# Patient Record
Sex: FEMALE | Race: WHITE | Employment: STUDENT | URBAN - METROPOLITAN AREA
[De-identification: names, ages, dates, MRNs, and addresses within clinical notes are randomized per-mention and may not be internally consistent; named-entity substitution may affect disease eponyms.]

---
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
## 2021-01-28 DIAGNOSIS — F41.9 ANXIETY: ICD-10-CM

## 2021-01-28 DIAGNOSIS — R63.5 WEIGHT GAIN: ICD-10-CM

## 2021-01-28 LAB
ALBUMIN SERPL-MCNC: 4.3 G/DL (ref 3.5–5.2)
ALP BLD-CCNC: 51 U/L (ref 35–104)
ALT SERPL-CCNC: 15 U/L (ref 0–32)
ANION GAP SERPL CALCULATED.3IONS-SCNC: 11 MMOL/L (ref 7–16)
AST SERPL-CCNC: 22 U/L (ref 0–31)
BASOPHILS ABSOLUTE: 0.03 E9/L (ref 0–0.2)
BASOPHILS RELATIVE PERCENT: 0.5 % (ref 0–2)
BILIRUB SERPL-MCNC: 0.6 MG/DL (ref 0–1.2)
BUN BLDV-MCNC: 16 MG/DL (ref 6–20)
CALCIUM SERPL-MCNC: 10.1 MG/DL (ref 8.6–10.2)
CHLORIDE BLD-SCNC: 101 MMOL/L (ref 98–107)
CHOLESTEROL, TOTAL: 157 MG/DL (ref 0–199)
CO2: 27 MMOL/L (ref 22–29)
CREAT SERPL-MCNC: 0.9 MG/DL (ref 0.5–1)
EOSINOPHILS ABSOLUTE: 0.07 E9/L (ref 0.05–0.5)
EOSINOPHILS RELATIVE PERCENT: 1.1 % (ref 0–6)
GFR AFRICAN AMERICAN: >60
GFR NON-AFRICAN AMERICAN: >60 ML/MIN/1.73
GLUCOSE BLD-MCNC: 88 MG/DL (ref 74–99)
HCT VFR BLD CALC: 42.6 % (ref 34–48)
HDLC SERPL-MCNC: 55 MG/DL
HEMOGLOBIN: 13.8 G/DL (ref 11.5–15.5)
IMMATURE GRANULOCYTES #: 0.01 E9/L
IMMATURE GRANULOCYTES %: 0.2 % (ref 0–5)
LDL CHOLESTEROL CALCULATED: 83 MG/DL (ref 0–99)
LYMPHOCYTES ABSOLUTE: 2.75 E9/L (ref 1.5–4)
LYMPHOCYTES RELATIVE PERCENT: 43.4 % (ref 20–42)
MCH RBC QN AUTO: 28.4 PG (ref 26–35)
MCHC RBC AUTO-ENTMCNC: 32.4 % (ref 32–34.5)
MCV RBC AUTO: 87.7 FL (ref 80–99.9)
MONOCYTES ABSOLUTE: 0.55 E9/L (ref 0.1–0.95)
MONOCYTES RELATIVE PERCENT: 8.7 % (ref 2–12)
NEUTROPHILS ABSOLUTE: 2.92 E9/L (ref 1.8–7.3)
NEUTROPHILS RELATIVE PERCENT: 46.1 % (ref 43–80)
PDW BLD-RTO: 11.7 FL (ref 11.5–15)
PLATELET # BLD: 311 E9/L (ref 130–450)
PMV BLD AUTO: 11.1 FL (ref 7–12)
POTASSIUM SERPL-SCNC: 4.2 MMOL/L (ref 3.5–5)
RBC # BLD: 4.86 E12/L (ref 3.5–5.5)
SODIUM BLD-SCNC: 139 MMOL/L (ref 132–146)
T4 TOTAL: 7.3 MCG/DL (ref 4.5–11.7)
TOTAL PROTEIN: 7.4 G/DL (ref 6.4–8.3)
TRIGL SERPL-MCNC: 95 MG/DL (ref 0–149)
TSH SERPL DL<=0.05 MIU/L-ACNC: 1.09 UIU/ML (ref 0.27–4.2)
VLDLC SERPL CALC-MCNC: 19 MG/DL
WBC # BLD: 6.3 E9/L (ref 4.5–11.5)

## 2021-02-01 ENCOUNTER — OFFICE VISIT (OUTPATIENT)
Dept: PRIMARY CARE CLINIC | Age: 21
End: 2021-02-01
Payer: COMMERCIAL

## 2021-02-01 VITALS
TEMPERATURE: 98.2 F | WEIGHT: 181 LBS | OXYGEN SATURATION: 97 % | DIASTOLIC BLOOD PRESSURE: 82 MMHG | SYSTOLIC BLOOD PRESSURE: 120 MMHG | HEART RATE: 104 BPM | BODY MASS INDEX: 25.24 KG/M2

## 2021-02-01 DIAGNOSIS — L30.9 DERMATITIS: ICD-10-CM

## 2021-02-01 DIAGNOSIS — R63.5 WEIGHT GAIN: ICD-10-CM

## 2021-02-01 DIAGNOSIS — F41.9 ANXIETY: Primary | ICD-10-CM

## 2021-02-01 PROCEDURE — 99214 OFFICE O/P EST MOD 30 MIN: CPT | Performed by: FAMILY MEDICINE

## 2021-02-01 RX ORDER — PREDNISONE 10 MG/1
10 TABLET ORAL 2 TIMES DAILY
Qty: 28 TABLET | Refills: 0 | Status: SHIPPED
Start: 2021-02-01 | End: 2021-02-15 | Stop reason: CLARIF

## 2021-02-01 RX ORDER — CLINDAMYCIN PHOSPHATE 10 UG/ML
LOTION TOPICAL PRN
COMMUNITY
Start: 2020-11-23 | End: 2022-03-23

## 2021-02-01 RX ORDER — KETOCONAZOLE 20 MG/G
CREAM TOPICAL
COMMUNITY
Start: 2021-01-06 | End: 2021-02-15

## 2021-02-01 ASSESSMENT — ENCOUNTER SYMPTOMS
GASTROINTESTINAL NEGATIVE: 1
EYES NEGATIVE: 1
ALLERGIC/IMMUNOLOGIC NEGATIVE: 1
RESPIRATORY NEGATIVE: 1

## 2021-02-01 NOTE — PROGRESS NOTES
21     Fredy Davies    : 2000 Sex: female   Age: 24 y.o. Chief Complaint   Patient presents with    Discuss Labs    Anxiety       Prior to Admission medications    Medication Sig Start Date End Date Taking? Authorizing Provider   clindamycin (CLEOCIN T) 1 % lotion as needed  20  Yes Historical Provider, MD   ketoconazole (NIZORAL) 2 % cream  21  Yes Historical Provider, MD   predniSONE (DELTASONE) 10 MG tablet Take 1 tablet by mouth 2 times daily for 14 days 2/1/21 2/15/21 Yes Narciso Caceres DO   MIBELAS 24 FE 1-20 MG-MCG(24) CHEW chew and swallow 1 tablet by mouth once daily 20  Yes Historical Provider, MD   EPINEPHrine (EPIPEN 2-ZAN) 0.3 MG/0.3ML SOAJ injection Inject 0.3 mLs into the muscle once for 1 dose Use as directed for allergic reaction 20 Yes Nadiya Isbell DO   FLUoxetine (PROZAC) 20 MG capsule Take 1 capsule by mouth daily 10/19/20  Yes Ulises Caceres DO   Adapalene 0.3 % GEL apply to affected area at bedtime 20  Yes Historical Provider, MD          HPI: Patient evaluated today with anxiety skin dermatitis weight gain. Medications reviewed well-tolerated continue as prescribed. Chest dermatitis appears to be more contact in nature rather than fungal.  Addition of some prednisone today and then encourage stopping the Nizoral cream temporarily and reassess in about 2 weeks. Encourage use of Dove soap for showering. Medically is otherwise stable weight remains stable continue encourage diet exercise. Anxiety stable on Prozac at 20 mg and maintain. Review of Systems   Constitutional: Negative. HENT: Negative. Eyes: Negative. Respiratory: Negative. Gastrointestinal: Negative. Endocrine: Negative. Genitourinary: Negative. Musculoskeletal: Negative. Skin: Positive for rash. Allergic/Immunologic: Negative. Neurological: Negative. Hematological: Negative. Psychiatric/Behavioral: Negative. Systems review stable aside from the rash as noted. Anxiety well controlled. Current Outpatient Medications:     clindamycin (CLEOCIN T) 1 % lotion, as needed , Disp: , Rfl:     ketoconazole (NIZORAL) 2 % cream, , Disp: , Rfl:     predniSONE (DELTASONE) 10 MG tablet, Take 1 tablet by mouth 2 times daily for 14 days, Disp: 28 tablet, Rfl: 0    MIBELAS 24 FE 1-20 MG-MCG(24) CHEW, chew and swallow 1 tablet by mouth once daily, Disp: , Rfl:     EPINEPHrine (EPIPEN 2-ZAN) 0.3 MG/0.3ML SOAJ injection, Inject 0.3 mLs into the muscle once for 1 dose Use as directed for allergic reaction, Disp: 0.3 mL, Rfl: 3    FLUoxetine (PROZAC) 20 MG capsule, Take 1 capsule by mouth daily, Disp: 30 capsule, Rfl: 5    Adapalene 0.3 % GEL, apply to affected area at bedtime, Disp: , Rfl:     Allergies   Allergen Reactions    Peanut-Containing Drug Products        Social History     Tobacco Use    Smoking status: Never Smoker    Smokeless tobacco: Never Used   Substance Use Topics    Alcohol use: Never     Frequency: Never    Drug use: Never      No past surgical history on file. No family history on file. No past medical history on file. Vitals:    02/01/21 1519   BP: 120/82   Pulse: (!) 104   Temp: 98.2 °F (36.8 °C)   SpO2: 97%   Weight: 181 lb (82.1 kg)     BP Readings from Last 3 Encounters:   02/01/21 120/82   12/21/20 112/60   11/23/20 120/78    Body mass index is 25.24 kg/m². Physical Exam  Vitals signs and nursing note reviewed. Constitutional:       Appearance: She is well-developed. HENT:      Head: Normocephalic. Right Ear: External ear normal.      Left Ear: External ear normal.      Nose: Nose normal.   Eyes:      Conjunctiva/sclera: Conjunctivae normal.      Pupils: Pupils are equal, round, and reactive to light. Neck:      Musculoskeletal: Normal range of motion and neck supple. Cardiovascular:      Rate and Rhythm: Normal rate. Pulmonary:      Breath sounds: Normal breath sounds.

## 2021-02-15 ENCOUNTER — OFFICE VISIT (OUTPATIENT)
Dept: PRIMARY CARE CLINIC | Age: 21
End: 2021-02-15
Payer: COMMERCIAL

## 2021-02-15 VITALS
HEART RATE: 112 BPM | BODY MASS INDEX: 24.97 KG/M2 | DIASTOLIC BLOOD PRESSURE: 82 MMHG | TEMPERATURE: 98.7 F | WEIGHT: 179 LBS | OXYGEN SATURATION: 97 % | SYSTOLIC BLOOD PRESSURE: 120 MMHG

## 2021-02-15 DIAGNOSIS — L70.0 ACNE VULGARIS: ICD-10-CM

## 2021-02-15 DIAGNOSIS — L30.9 DERMATITIS: Primary | ICD-10-CM

## 2021-02-15 DIAGNOSIS — J30.2 SEASONAL ALLERGIES: ICD-10-CM

## 2021-02-15 DIAGNOSIS — M54.50 MIDLINE LOW BACK PAIN WITHOUT SCIATICA, UNSPECIFIED CHRONICITY: ICD-10-CM

## 2021-02-15 DIAGNOSIS — F41.9 ANXIETY: ICD-10-CM

## 2021-02-15 PROCEDURE — 99214 OFFICE O/P EST MOD 30 MIN: CPT | Performed by: FAMILY MEDICINE

## 2021-02-15 RX ORDER — FLUOXETINE HYDROCHLORIDE 20 MG/1
20 CAPSULE ORAL DAILY
Qty: 30 CAPSULE | Refills: 5 | Status: SHIPPED
Start: 2021-02-15 | End: 2021-10-07

## 2021-02-15 ASSESSMENT — ENCOUNTER SYMPTOMS
ALLERGIC/IMMUNOLOGIC NEGATIVE: 1
GASTROINTESTINAL NEGATIVE: 1
RESPIRATORY NEGATIVE: 1
BACK PAIN: 1
EYES NEGATIVE: 1

## 2021-02-15 NOTE — PROGRESS NOTES
2/15/21     Yudith Velez    : 2000 Sex: female   Age: 24 y.o. Chief Complaint   Patient presents with    Rash     chest rash improved    Anxiety       Prior to Admission medications    Medication Sig Start Date End Date Taking? Authorizing Provider   FLUoxetine (PROZAC) 20 MG capsule Take 1 capsule by mouth daily 2/15/21  Yes Tian Caceres DO   clindamycin (CLEOCIN T) 1 % lotion as needed  20  Yes Historical Provider, MD   MIBELAS 24 FE 1-20 MG-MCG(24) CHEW chew and swallow 1 tablet by mouth once daily 20  Yes Historical Provider, MD   EPINEPHrine (EPIPEN 2-ZAN) 0.3 MG/0.3ML SOAJ injection Inject 0.3 mLs into the muscle once for 1 dose Use as directed for allergic reaction 11/23/20 2/15/21 Yes Nadiya Isbell,    Adapalene 0.3 % GEL apply to affected area at bedtime 20  Yes Historical Provider, MD          HPI: Patient evaluated today dermatitis anxiety acne vulgaris seasonal allergies low back discomfort. Medically she is doing much better. Skin rash on the chest is completely resolved. Some mild complaints of low back pain recommended some home stretches and then if persistent would follow-up. Anxiety much better control on the Prozac and continue as prescribed. Review of Systems   Constitutional: Negative. HENT: Negative. Eyes: Negative. Respiratory: Negative. Gastrointestinal: Negative. Endocrine: Negative. Genitourinary: Negative. Musculoskeletal: Positive for arthralgias, back pain and myalgias. Skin: Negative. Allergic/Immunologic: Negative. Neurological: Negative. Hematological: Negative. Psychiatric/Behavioral: Negative.                Current Outpatient Medications:     FLUoxetine (PROZAC) 20 MG capsule, Take 1 capsule by mouth daily, Disp: 30 capsule, Rfl: 5    clindamycin (CLEOCIN T) 1 % lotion, as needed , Disp: , Rfl:     MIBELAS 24 FE 1-20 MG-MCG(24) CHEW, chew and swallow 1 tablet by mouth once daily, Disp: , Rfl:     EPINEPHrine (EPIPEN 2-ZAN) 0.3 MG/0.3ML SOAJ injection, Inject 0.3 mLs into the muscle once for 1 dose Use as directed for allergic reaction, Disp: 0.3 mL, Rfl: 3    Adapalene 0.3 % GEL, apply to affected area at bedtime, Disp: , Rfl:     Allergies   Allergen Reactions    Peanut-Containing Drug Products        Social History     Tobacco Use    Smoking status: Never Smoker    Smokeless tobacco: Never Used   Substance Use Topics    Alcohol use: Never     Frequency: Never    Drug use: Never      No past surgical history on file. No family history on file. No past medical history on file. Vitals:    02/15/21 1519   BP: 120/82   Pulse: 112   Temp: 98.7 °F (37.1 °C)   SpO2: 97%   Weight: 179 lb (81.2 kg)     BP Readings from Last 3 Encounters:   02/15/21 120/82   02/01/21 120/82   12/21/20 112/60        Physical Exam  Vitals signs and nursing note reviewed. Constitutional:       Appearance: She is well-developed. HENT:      Head: Normocephalic. Right Ear: External ear normal.      Left Ear: External ear normal.      Nose: Nose normal.   Eyes:      Conjunctiva/sclera: Conjunctivae normal.      Pupils: Pupils are equal, round, and reactive to light. Neck:      Musculoskeletal: Normal range of motion and neck supple. Cardiovascular:      Rate and Rhythm: Normal rate. Pulmonary:      Breath sounds: Normal breath sounds. Abdominal:      General: Bowel sounds are normal.      Palpations: Abdomen is soft. Musculoskeletal: Normal range of motion. Skin:     General: Skin is warm and dry. Neurological:      Mental Status: She is alert and oriented to person, place, and time. Psychiatric:         Behavior: Behavior normal.        Today's vitals physical examination stable. We will maintain present meds and care. Reassessment with me now in 3 months and sooner if problems. Instructed in some home exercise and if persistent worsening problems recommended follow-up.         Plan

## 2021-05-13 ENCOUNTER — OFFICE VISIT (OUTPATIENT)
Dept: PRIMARY CARE CLINIC | Age: 21
End: 2021-05-13
Payer: COMMERCIAL

## 2021-05-13 VITALS
DIASTOLIC BLOOD PRESSURE: 70 MMHG | BODY MASS INDEX: 24.5 KG/M2 | OXYGEN SATURATION: 98 % | TEMPERATURE: 97.7 F | HEART RATE: 90 BPM | HEIGHT: 71 IN | SYSTOLIC BLOOD PRESSURE: 100 MMHG | WEIGHT: 175 LBS

## 2021-05-13 DIAGNOSIS — R63.5 WEIGHT GAIN: ICD-10-CM

## 2021-05-13 DIAGNOSIS — J30.1 SEASONAL ALLERGIC RHINITIS DUE TO POLLEN: ICD-10-CM

## 2021-05-13 DIAGNOSIS — M54.50 CHRONIC MIDLINE LOW BACK PAIN WITHOUT SCIATICA: ICD-10-CM

## 2021-05-13 DIAGNOSIS — M54.2 CERVICALGIA: ICD-10-CM

## 2021-05-13 DIAGNOSIS — F41.9 ANXIETY: Primary | ICD-10-CM

## 2021-05-13 DIAGNOSIS — G89.29 CHRONIC MIDLINE LOW BACK PAIN WITHOUT SCIATICA: ICD-10-CM

## 2021-05-13 PROCEDURE — 99214 OFFICE O/P EST MOD 30 MIN: CPT | Performed by: FAMILY MEDICINE

## 2021-05-13 RX ORDER — NORETHINDRONE AND ETHINYL ESTRADIOL TABLETS 0.4-0.035
KIT ORAL
COMMUNITY
Start: 2021-03-14 | End: 2021-11-24

## 2021-05-13 ASSESSMENT — PATIENT HEALTH QUESTIONNAIRE - PHQ9
1. LITTLE INTEREST OR PLEASURE IN DOING THINGS: 0
2. FEELING DOWN, DEPRESSED OR HOPELESS: 0
SUM OF ALL RESPONSES TO PHQ QUESTIONS 1-9: 0

## 2021-05-13 ASSESSMENT — ENCOUNTER SYMPTOMS
ALLERGIC/IMMUNOLOGIC NEGATIVE: 1
EYES NEGATIVE: 1
GASTROINTESTINAL NEGATIVE: 1
RESPIRATORY NEGATIVE: 1

## 2021-05-13 NOTE — PROGRESS NOTES
21     Erica Sotomayor    : 2000 Sex: female   Age: 22 y.o. Chief Complaint   Patient presents with    Anxiety       Prior to Admission medications    Medication Sig Start Date End Date Taking? Authorizing Provider   Jun Degroot 0.4-35 MG-MCG per tablet take 1 tablet by mouth once daily 3/14/21  Yes Historical Provider, MD   FLUoxetine (PROZAC) 20 MG capsule Take 1 capsule by mouth daily 2/15/21  Yes Joshua Caceres DO   clindamycin (CLEOCIN T) 1 % lotion as needed  20  Yes Historical Provider, MD   Adapalene 0.3 % GEL apply to affected area at bedtime 20  Yes Historical Provider, MD   EPINEPHrine (EPIPEN 2-ZAN) 0.3 MG/0.3ML SOAJ injection Inject 0.3 mLs into the muscle once for 1 dose Use as directed for allergic reaction 11/23/20 2/15/21  Nadiya Isbell DO          HPI: Patient evaluated today with anxiety weight gain seasonal allergies chronic back pain and neck discomfort. Neck and back primarily muscular in origin. Chiropractics discussed and she is in agreement. Should benefit. Anxiety significantly improved on fluoxetine and doing well. Maintain med at current dose. Weight is down to 175 pounds and has responded very nicely to the change in medicine to Prozac. Review of Systems   Constitutional: Negative. HENT: Negative. Eyes: Negative. Respiratory: Negative. Gastrointestinal: Negative. Endocrine: Negative. Genitourinary: Negative. Musculoskeletal: Negative. Skin: Negative. Allergic/Immunologic: Negative. Neurological: Negative. Hematological: Negative. Psychiatric/Behavioral: Negative.                Current Outpatient Medications:     PHILITH 0.4-35 MG-MCG per tablet, take 1 tablet by mouth once daily, Disp: , Rfl:     FLUoxetine (PROZAC) 20 MG capsule, Take 1 capsule by mouth daily, Disp: 30 capsule, Rfl: 5    clindamycin (CLEOCIN T) 1 % lotion, as needed , Disp: , Rfl:     Adapalene 0.3 % GEL, apply to affected area

## 2021-05-17 ENCOUNTER — OFFICE VISIT (OUTPATIENT)
Dept: PRIMARY CARE CLINIC | Age: 21
End: 2021-05-17
Payer: COMMERCIAL

## 2021-05-17 ENCOUNTER — TELEPHONE (OUTPATIENT)
Dept: PRIMARY CARE CLINIC | Age: 21
End: 2021-05-17

## 2021-05-17 VITALS
OXYGEN SATURATION: 98 % | TEMPERATURE: 97.9 F | HEART RATE: 76 BPM | SYSTOLIC BLOOD PRESSURE: 120 MMHG | DIASTOLIC BLOOD PRESSURE: 82 MMHG

## 2021-05-17 DIAGNOSIS — J30.1 SEASONAL ALLERGIC RHINITIS DUE TO POLLEN: Primary | ICD-10-CM

## 2021-05-17 DIAGNOSIS — F41.9 ANXIETY: ICD-10-CM

## 2021-05-17 PROCEDURE — 96372 THER/PROPH/DIAG INJ SC/IM: CPT | Performed by: FAMILY MEDICINE

## 2021-05-17 PROCEDURE — 99213 OFFICE O/P EST LOW 20 MIN: CPT | Performed by: FAMILY MEDICINE

## 2021-05-17 RX ORDER — METHYLPREDNISOLONE ACETATE 80 MG/ML
80 INJECTION, SUSPENSION INTRA-ARTICULAR; INTRALESIONAL; INTRAMUSCULAR; SOFT TISSUE ONCE
Status: COMPLETED | OUTPATIENT
Start: 2021-05-17 | End: 2021-05-17

## 2021-05-17 RX ADMIN — METHYLPREDNISOLONE ACETATE 80 MG: 80 INJECTION, SUSPENSION INTRA-ARTICULAR; INTRALESIONAL; INTRAMUSCULAR; SOFT TISSUE at 14:10

## 2021-05-17 ASSESSMENT — ENCOUNTER SYMPTOMS
ALLERGIC/IMMUNOLOGIC NEGATIVE: 1
GASTROINTESTINAL NEGATIVE: 1
EYES NEGATIVE: 1
RESPIRATORY NEGATIVE: 1

## 2021-05-17 NOTE — PROGRESS NOTES
21     Erica Sotomayor    : 2000 Sex: female   Age: 22 y.o. Chief Complaint   Patient presents with    Drainage     alot of sinus drainage       Prior to Admission medications    Medication Sig Start Date End Date Taking? Authorizing Provider   Jun Degroot 0.4-35 MG-MCG per tablet take 1 tablet by mouth once daily 3/14/21  Yes Historical Provider, MD   FLUoxetine (PROZAC) 20 MG capsule Take 1 capsule by mouth daily 2/15/21  Yes Parish Caceres, DO   clindamycin (CLEOCIN T) 1 % lotion as needed  20  Yes Historical Provider, MD   EPINEPHrine (EPIPEN 2-ZAN) 0.3 MG/0.3ML SOAJ injection Inject 0.3 mLs into the muscle once for 1 dose Use as directed for allergic reaction 20 Yes Nadiya Isbell, DO   Adapalene 0.3 % GEL apply to affected area at bedtime 20  Yes Historical Provider, MD          HPI: Patient seen today problems seasonal allergies anxiety. Recent evaluation on her anxiety depression she is doing very well. Today primary issues of seasonal allergies. We are are going to treat with cc of Depo-Medrol which she has done very well with in the past 80 mg. Review of Systems   Constitutional: Negative. HENT: Positive for congestion. Eyes: Negative. Respiratory: Negative. Gastrointestinal: Negative. Endocrine: Negative. Genitourinary: Negative. Musculoskeletal: Negative. Skin: Negative. Allergic/Immunologic: Negative. Neurological: Negative. Hematological: Negative. Psychiatric/Behavioral: Negative. Mild congestion itchy eyes nasal drainage all related to seasonal allergies.           Current Outpatient Medications:     PHILITH 0.4-35 MG-MCG per tablet, take 1 tablet by mouth once daily, Disp: , Rfl:     FLUoxetine (PROZAC) 20 MG capsule, Take 1 capsule by mouth daily, Disp: 30 capsule, Rfl: 5    clindamycin (CLEOCIN T) 1 % lotion, as needed , Disp: , Rfl:     EPINEPHrine (EPIPEN 2-ZAN) 0.3 MG/0.3ML SOAJ injection, Inject 0.3 mLs into the muscle once for 1 dose Use as directed for allergic reaction, Disp: 0.3 mL, Rfl: 3    Adapalene 0.3 % GEL, apply to affected area at bedtime, Disp: , Rfl:     Allergies   Allergen Reactions    Peanut-Containing Drug Products        Social History     Tobacco Use    Smoking status: Never Smoker    Smokeless tobacco: Never Used   Substance Use Topics    Alcohol use: Never    Drug use: Never      No past surgical history on file. No family history on file. No past medical history on file. Vitals:    05/17/21 1348   BP: 120/82   Pulse: 76   Temp: 97.9 °F (36.6 °C)   SpO2: 98%     BP Readings from Last 3 Encounters:   05/17/21 120/82   05/13/21 100/70   02/15/21 120/82        Physical Exam  Nursing note reviewed. Blood pressure controlled heart regular lungs clear. Clear drainage posterior pharynx mild conjunctival injection. Treatment with Depo-Medrol injection 80 mg Zyrtec as needed and follow-up if necessary. Plan Per Assessment:  Jyothi Reyes was seen today for drainage. Diagnoses and all orders for this visit:    Seasonal allergic rhinitis due to pollen    Anxiety            No follow-ups on file. Manjula Argentina, DO    Note was generated with the assistance of voice recognition software. Document was reviewed however may contain grammatical errors.

## 2021-05-17 NOTE — TELEPHONE ENCOUNTER
Spoke with pt to schedule Chiro appointment. She is a patient of Dr. Jeet Cain, she is wondering if she can get an allergy shot sometime today. She is going to be leaving to go out of town later this week. She can be reached at Ph. [phone].

## 2021-05-25 ENCOUNTER — OFFICE VISIT (OUTPATIENT)
Dept: CHIROPRACTIC MEDICINE | Age: 21
End: 2021-05-25
Payer: COMMERCIAL

## 2021-05-25 VITALS — HEART RATE: 78 BPM | RESPIRATION RATE: 16 BRPM | TEMPERATURE: 98 F | OXYGEN SATURATION: 98 %

## 2021-05-25 DIAGNOSIS — M54.50 CHRONIC MIDLINE LOW BACK PAIN WITHOUT SCIATICA: Primary | ICD-10-CM

## 2021-05-25 DIAGNOSIS — M62.830 MUSCLE SPASM OF BACK: ICD-10-CM

## 2021-05-25 DIAGNOSIS — G89.29 CHRONIC MIDLINE LOW BACK PAIN WITHOUT SCIATICA: Primary | ICD-10-CM

## 2021-05-25 DIAGNOSIS — M54.04 PANNICULITIS AFFECTING REGIONS OF NECK AND BACK, THORACIC REGION: ICD-10-CM

## 2021-05-25 DIAGNOSIS — M54.2 CERVICALGIA: ICD-10-CM

## 2021-05-25 PROCEDURE — 97014 ELECTRIC STIMULATION THERAPY: CPT | Performed by: CHIROPRACTOR

## 2021-05-25 PROCEDURE — 99203 OFFICE O/P NEW LOW 30 MIN: CPT | Performed by: CHIROPRACTOR

## 2021-05-25 PROCEDURE — 98941 CHIROPRACT MANJ 3-4 REGIONS: CPT | Performed by: CHIROPRACTOR

## 2021-05-25 ASSESSMENT — ENCOUNTER SYMPTOMS
SHORTNESS OF BREATH: 0
BACK PAIN: 1
COUGH: 0

## 2021-05-25 NOTE — PROGRESS NOTES
[facility]    21  Erica Sotomayor : 2000 Sex: female  Age: 22 y.o. Patient was referred by Nadira España DO    Chief Complaint   Patient presents with    Neck Pain    Lower Back Pain     Slid on ice in February and has had pain and tightness since. Tiana Gloss on butt in February, hit head. Since then she's been off  Feels like low back needs cracked, neck feels tight. She contineus to work out most days of the week -- cardio and weights  No UE/LE issues  Low back worse than upper/neck  Just back from vacation - 12 hour drive seemed to flare up. Shoulders feel uneven, R handed. Back Pain  This is a new problem. The current episode started more than 1 month ago. The problem occurs constantly. The problem has been waxing and waning since onset. The pain is present in the thoracic spine and lumbar spine (cervical). Quality: tight/ache. The pain does not radiate. The pain is at a severity of 5/10. Stiffness is present in the morning. Pertinent negatives include no fever, leg pain, numbness or weakness. She has tried home exercises, analgesics and NSAIDs for the symptoms. Red Flags:  none    Review of Systems   Constitutional: Negative for chills and fever. Respiratory: Negative for cough and shortness of breath. Musculoskeletal: Positive for back pain, myalgias and neck stiffness. Neurological: Negative for weakness and numbness.          Current Outpatient Medications:     PHILITH 0.4-35 MG-MCG per tablet, take 1 tablet by mouth once daily, Disp: , Rfl:     FLUoxetine (PROZAC) 20 MG capsule, Take 1 capsule by mouth daily, Disp: 30 capsule, Rfl: 5    clindamycin (CLEOCIN T) 1 % lotion, as needed , Disp: , Rfl:     EPINEPHrine (EPIPEN 2-ZAN) 0.3 MG/0.3ML SOAJ injection, Inject 0.3 mLs into the muscle once for 1 dose Use as directed for allergic reaction, Disp: 0.3 mL, Rfl: 3    Adapalene 0.3 % GEL, apply to affected area at bedtime, Disp: , Rfl: Allergies   Allergen Reactions    Peanut-Containing Drug Products        No past medical history on file. No family history on file. No past surgical history on file. Social History     Socioeconomic History    Marital status: Single     Spouse name: Not on file    Number of children: Not on file    Years of education: Not on file    Highest education level: Not on file   Occupational History    Not on file   Tobacco Use    Smoking status: Never Smoker    Smokeless tobacco: Never Used   Substance and Sexual Activity    Alcohol use: Never    Drug use: Never    Sexual activity: Not on file   Other Topics Concern    Not on file   Social History Narrative    Not on file     Social Determinants of Health     Financial Resource Strain:     Difficulty of Paying Living Expenses:    Food Insecurity:     Worried About Running Out of Food in the Last Year:     [address] in the Last Year:    Transportation Needs:     Lack of Transportation (Medical):  Lack of Transportation (Non-Medical):    Physical Activity:     Days of Exercise per Week:     Minutes of Exercise per Session:    Stress:     Feeling of Stress :    Social Connections:     Frequency of Communication with Friends and Family:     Frequency of Social Gatherings with Friends and Family:     Attends Buddhist Services:     Active Member of Clubs or Organizations:     Attends Club or Organization Meetings:     Marital Status:    Intimate Partner Violence:     Fear of Current or Ex-Partner:     Emotionally Abused:     Physically Abused:     Sexually Abused:        Vitals:    05/25/21 0941   Pulse: 78   Resp: 16   Temp: 98 °F (36.7 °C)   TempSrc: Temporal   SpO2: 98%       EXAM:  She stands unassisted. No antalgia or list.  There is some elevation of the left shoulder compared to right. Mild left scapular winging. In Magda Kartik position suggests possibility of underlying scoliotic deformity.     She displays near full, complete and active range of motion of the lumbar spine with the exception of extension which was 50% of normal and causing endrange pain  In the cervical spine motions were mildly limited throughout without pain. She is neurovascularly intact throughout the upper and lower extremities. Active trigger point left trapezius, left levator scapula. No midline pain throughout the cervical or thoracic regions. She did have some tenderness in the midline however over L5-S1. Hypertonic and tender fibers throughout the cervical, thoracic and lumbar paraspinal muscles. Joint fixation of motion screening C6-T1, T5-6, L5-S1    Cervical compression and distraction test were both negative. Foraminal compression testing and maximum cervical rotatory compression tests are negative bilateral.  Seated and supine SLRs are both negative. Braggart's testing is negative bilateral Valverde's test negative bilateral.  Prone lying reduces pain. Prone lying in extension increases pain to 5-6/10. Repeated extensions in lying-not performed    Erica was seen today for neck pain and lower back pain. Diagnoses and all orders for this visit:    Chronic midline low back pain without sciatica    Panniculitis affecting regions of neck and back, thoracic region    Cervicalgia    Muscle spasm of back        Treatment Plan:   I spoke with her regarding my exam findings and treatment options. I recommended that we start a trial of conservative care today consisting manipulation, EMS. I will plan on seeing her 1 times per week for 4 weeks, then reassess to determine response to care and future options. With consent, I did begin today. Consider updating some imaging should she fail to respond to care. Possibility getting a scoliosis study as well      Problem/Goals  Decrease pain and/or swelling    Today's Treatment  EMS with heat to the lumbar region for 15 minutes to address muscle spasm/hypertension and alleviate pain.   Diversified

## 2021-06-01 ENCOUNTER — OFFICE VISIT (OUTPATIENT)
Dept: CHIROPRACTIC MEDICINE | Age: 21
End: 2021-06-01
Payer: COMMERCIAL

## 2021-06-01 VITALS — RESPIRATION RATE: 14 BRPM | TEMPERATURE: 98 F | HEART RATE: 68 BPM | OXYGEN SATURATION: 97 %

## 2021-06-01 DIAGNOSIS — M54.50 CHRONIC MIDLINE LOW BACK PAIN WITHOUT SCIATICA: Primary | ICD-10-CM

## 2021-06-01 DIAGNOSIS — G89.29 CHRONIC MIDLINE LOW BACK PAIN WITHOUT SCIATICA: Primary | ICD-10-CM

## 2021-06-01 DIAGNOSIS — M54.2 CERVICALGIA: ICD-10-CM

## 2021-06-01 DIAGNOSIS — M62.830 MUSCLE SPASM OF BACK: ICD-10-CM

## 2021-06-01 DIAGNOSIS — M54.04 PANNICULITIS AFFECTING REGIONS OF NECK AND BACK, THORACIC REGION: ICD-10-CM

## 2021-06-01 PROCEDURE — 98941 CHIROPRACT MANJ 3-4 REGIONS: CPT | Performed by: CHIROPRACTOR

## 2021-06-01 PROCEDURE — 97014 ELECTRIC STIMULATION THERAPY: CPT | Performed by: CHIROPRACTOR

## 2021-06-01 NOTE — PROGRESS NOTES
21  Erica Sotomayor : 2000 Sex: female  Age: 22 y.o. Chief Complaint   Patient presents with    Lower Back Pain       HPI:   Neck and upper back pain is improving, but low back pain is essentially unchanged. She feels that some of her gym activities may be provoking the lower back. She is particularly noting some hip extension type exercises, core exercises as provocative. Also laying prone on the table today gave her some discomfort. Current Outpatient Medications:     PHILITH 0.4-35 MG-MCG per tablet, take 1 tablet by mouth once daily, Disp: , Rfl:     FLUoxetine (PROZAC) 20 MG capsule, Take 1 capsule by mouth daily, Disp: 30 capsule, Rfl: 5    clindamycin (CLEOCIN T) 1 % lotion, as needed , Disp: , Rfl:     EPINEPHrine (EPIPEN 2-ZAN) 0.3 MG/0.3ML SOAJ injection, Inject 0.3 mLs into the muscle once for 1 dose Use as directed for allergic reaction, Disp: 0.3 mL, Rfl: 3    Adapalene 0.3 % GEL, apply to affected area at bedtime, Disp: , Rfl:     Exam:   Vitals:    21 0908   Pulse: 68   Resp: 14   Temp: 98 °F (36.7 °C)   SpO2: 97%     Prone lying in extension increases symptoms. She has some tenderness over the left SI joint and left L5-S1 facet today. There are hypertonic and tender fibers noted today in the lumbar, thoracic and cervical paraspinal muscles. Joint fixation is noted with motion screening at C6-T1, T4-6, T12-L1, L5-S1. Erica was seen today for lower back pain. Diagnoses and all orders for this visit:    Chronic midline low back pain without sciatica    Panniculitis affecting regions of neck and back, thoracic region    Cervicalgia    Muscle spasm of back        Treatment Plan: She has extension intolerant low back pain, seemingly provoked by ADLs and positions. Going to try modify these today. We went through some exercise modifications for her to do at the gym.   I will also send her some core exercises focusing on antirotation and antiextension. Trujillo flexion distraction manipulation was performed today at L5, protocol 1. Diversified manipulation to listed cervical and thoracic, thoracolumbar segments. Tolerated well. EMS with heat to the low back for 15 minutes to address pain and muscle spasming. Tolerated well. I will see her back later this week for care. We did discuss getting some x-rays on her lower back.   We will see how she responds and proceed accordingly      Seen By:  Evelyn Hope, DC

## 2021-06-04 ENCOUNTER — OFFICE VISIT (OUTPATIENT)
Dept: CHIROPRACTIC MEDICINE | Age: 21
End: 2021-06-04
Payer: COMMERCIAL

## 2021-06-04 VITALS — RESPIRATION RATE: 14 BRPM | TEMPERATURE: 98 F | HEART RATE: 58 BPM | OXYGEN SATURATION: 98 %

## 2021-06-04 DIAGNOSIS — M62.830 MUSCLE SPASM OF BACK: ICD-10-CM

## 2021-06-04 DIAGNOSIS — M54.04 PANNICULITIS AFFECTING REGIONS OF NECK AND BACK, THORACIC REGION: ICD-10-CM

## 2021-06-04 DIAGNOSIS — M54.50 CHRONIC MIDLINE LOW BACK PAIN WITHOUT SCIATICA: Primary | ICD-10-CM

## 2021-06-04 DIAGNOSIS — G89.29 CHRONIC MIDLINE LOW BACK PAIN WITHOUT SCIATICA: Primary | ICD-10-CM

## 2021-06-04 PROCEDURE — 98940 CHIROPRACT MANJ 1-2 REGIONS: CPT | Performed by: CHIROPRACTOR

## 2021-06-04 PROCEDURE — 97014 ELECTRIC STIMULATION THERAPY: CPT | Performed by: CHIROPRACTOR

## 2021-06-07 ENCOUNTER — OFFICE VISIT (OUTPATIENT)
Dept: CHIROPRACTIC MEDICINE | Age: 21
End: 2021-06-07
Payer: COMMERCIAL

## 2021-06-07 VITALS — OXYGEN SATURATION: 98 % | HEART RATE: 80 BPM | TEMPERATURE: 98.8 F | RESPIRATION RATE: 14 BRPM

## 2021-06-07 DIAGNOSIS — M62.830 MUSCLE SPASM OF BACK: ICD-10-CM

## 2021-06-07 DIAGNOSIS — M54.2 CERVICALGIA: ICD-10-CM

## 2021-06-07 DIAGNOSIS — G89.29 CHRONIC MIDLINE LOW BACK PAIN WITHOUT SCIATICA: Primary | ICD-10-CM

## 2021-06-07 DIAGNOSIS — M54.50 CHRONIC MIDLINE LOW BACK PAIN WITHOUT SCIATICA: Primary | ICD-10-CM

## 2021-06-07 DIAGNOSIS — M54.04 PANNICULITIS AFFECTING REGIONS OF NECK AND BACK, THORACIC REGION: ICD-10-CM

## 2021-06-07 PROCEDURE — 98941 CHIROPRACT MANJ 3-4 REGIONS: CPT | Performed by: CHIROPRACTOR

## 2021-06-07 NOTE — PROGRESS NOTES
21  Erica Sotomayor : 2000 Sex: female  Age: 22 y.o. Chief Complaint   Patient presents with    Lower Back Pain    Results     xray results       HPI:   Canon dominguez returns today for care. She is accompanied by her mother. Over the past several days she is admittedly not done much. She has not gone to the gym, has not done her HEP. As a result, she is feeling pretty good today. Minimal pain if any. She would like to review her x-rays. She does have some continued tightness in soreness in the lower back, with knots and discomfort in the neck/upper back and interscapular region again today per her identification. Current Outpatient Medications:     PHILITH 0.4-35 MG-MCG per tablet, take 1 tablet by mouth once daily, Disp: , Rfl:     FLUoxetine (PROZAC) 20 MG capsule, Take 1 capsule by mouth daily, Disp: 30 capsule, Rfl: 5    clindamycin (CLEOCIN T) 1 % lotion, as needed , Disp: , Rfl:     EPINEPHrine (EPIPEN 2-ZAN) 0.3 MG/0.3ML SOAJ injection, Inject 0.3 mLs into the muscle once for 1 dose Use as directed for allergic reaction, Disp: 0.3 mL, Rfl: 3    Adapalene 0.3 % GEL, apply to affected area at bedtime, Disp: , Rfl:     Exam:   Vitals:    21 0922   Pulse: 80   Resp: 14   Temp: 98.8 °F (37.1 °C)   SpO2: 98%      She appears well. No distress. No midline pain with palpation. RFIL x 10- decreased during loading/better after loading  There are hypertonic and tender fibers noted today in the cervical, thoracic and lumbar paraspinal muscles. Joint fixation is noted with motion screening at C5-6, T3-5, L4-5 and T12-L1. Erica was seen today for lower back pain and results. Diagnoses and all orders for this visit:    Chronic midline low back pain without sciatica    Panniculitis affecting regions of neck and back, thoracic region    Muscle spasm of back    Cervicalgia        Treatment Plan: I performed Trujillo flexion distraction manipulation at L4 today, protocol 1.

## 2021-06-21 ENCOUNTER — OFFICE VISIT (OUTPATIENT)
Dept: CHIROPRACTIC MEDICINE | Age: 21
End: 2021-06-21
Payer: COMMERCIAL

## 2021-06-21 VITALS — OXYGEN SATURATION: 98 % | HEART RATE: 62 BPM | RESPIRATION RATE: 16 BRPM | TEMPERATURE: 98 F

## 2021-06-21 DIAGNOSIS — M62.830 MUSCLE SPASM OF BACK: ICD-10-CM

## 2021-06-21 DIAGNOSIS — M54.04 PANNICULITIS AFFECTING REGIONS OF NECK AND BACK, THORACIC REGION: ICD-10-CM

## 2021-06-21 DIAGNOSIS — M54.50 CHRONIC MIDLINE LOW BACK PAIN WITHOUT SCIATICA: Primary | ICD-10-CM

## 2021-06-21 DIAGNOSIS — G89.29 CHRONIC MIDLINE LOW BACK PAIN WITHOUT SCIATICA: Primary | ICD-10-CM

## 2021-06-21 DIAGNOSIS — M54.2 CERVICALGIA: ICD-10-CM

## 2021-06-21 PROCEDURE — 98941 CHIROPRACT MANJ 3-4 REGIONS: CPT | Performed by: CHIROPRACTOR

## 2021-06-21 NOTE — PROGRESS NOTES
21  Marily Cheney : 2000 Sex: female  Age: 22 y.o. Chief Complaint   Patient presents with    Lower Back Pain     tightness present today       HPI:   No longer with constant LBP. Still feels tight though throughout her back. 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 Feels some clicking and popping in her lower back when she sits in a certain position and flexes some muscles. No new issues today. Current Outpatient Medications:     PHILITH 0.4-35 MG-MCG per tablet, take 1 tablet by mouth once daily, Disp: , Rfl:     FLUoxetine (PROZAC) 20 MG capsule, Take 1 capsule by mouth daily, Disp: 30 capsule, Rfl: 5    clindamycin (CLEOCIN T) 1 % lotion, as needed , Disp: , Rfl:     EPINEPHrine (EPIPEN 2-ZAN) 0.3 MG/0.3ML SOAJ injection, Inject 0.3 mLs into the muscle once for 1 dose Use as directed for allergic reaction, Disp: 0.3 mL, Rfl: 3    Adapalene 0.3 % GEL, apply to affected area at bedtime, Disp: , Rfl:     Exam:   Vitals:    21 1551   Pulse: 62   Resp: 16   Temp: 98 °F (36.7 °C)   SpO2: 98%         There are hypertonic and tender fibers noted today in the lumbar, thoracic and cervical paraspinal muscles. There is some midline pain at L5-S1. Trigger points bilateral rhomboids, bilateral trapezius. Joint fixation is noted with motion screening at C6-7, L4-5, T4-6. Erica was seen today for lower back pain. Diagnoses and all orders for this visit:    Chronic midline low back pain without sciatica    Panniculitis affecting regions of neck and back, thoracic region    Muscle spasm of back    Cervicalgia        Treatment Plan: Continue diversified manipulation to the affected segments in the cervical, thoracic and lumbar regions. Also perform some grade 4 mobilizations in the mid Thoracics, not meeting time requirement for care. Tolerated well. Want to continue with home-based self-care.   See me as needed for treatment      Seen By:  Yeny Armstrong DC\

## 2021-07-19 ENCOUNTER — OFFICE VISIT (OUTPATIENT)
Dept: FAMILY MEDICINE CLINIC | Age: 21
End: 2021-07-19
Payer: COMMERCIAL

## 2021-07-19 VITALS
OXYGEN SATURATION: 96 % | TEMPERATURE: 98.7 F | BODY MASS INDEX: 23.99 KG/M2 | DIASTOLIC BLOOD PRESSURE: 72 MMHG | SYSTOLIC BLOOD PRESSURE: 104 MMHG | WEIGHT: 172 LBS | HEART RATE: 107 BPM

## 2021-07-19 DIAGNOSIS — J02.9 SORE THROAT: ICD-10-CM

## 2021-07-19 DIAGNOSIS — R33.9 URINARY RETENTION: Primary | ICD-10-CM

## 2021-07-19 DIAGNOSIS — R33.9 URINARY RETENTION: ICD-10-CM

## 2021-07-19 DIAGNOSIS — M54.50 ACUTE MIDLINE LOW BACK PAIN WITHOUT SCIATICA: ICD-10-CM

## 2021-07-19 LAB
BILIRUBIN, POC: NORMAL
BLOOD URINE, POC: NORMAL
CLARITY, POC: CLEAR
COLOR, POC: YELLOW
GLUCOSE URINE, POC: NORMAL
KETONES, POC: NORMAL
LEUKOCYTE EST, POC: NORMAL
NITRITE, POC: NORMAL
PH, POC: 6
PROTEIN, POC: NORMAL
SPECIFIC GRAVITY, POC: 1.02
UROBILINOGEN, POC: 2

## 2021-07-19 PROCEDURE — 81002 URINALYSIS NONAUTO W/O SCOPE: CPT | Performed by: FAMILY MEDICINE

## 2021-07-19 PROCEDURE — 99213 OFFICE O/P EST LOW 20 MIN: CPT | Performed by: FAMILY MEDICINE

## 2021-07-19 RX ORDER — AMOXICILLIN AND CLAVULANATE POTASSIUM 875; 125 MG/1; MG/1
1 TABLET, FILM COATED ORAL 2 TIMES DAILY
Qty: 20 TABLET | Refills: 0 | Status: SHIPPED | OUTPATIENT
Start: 2021-07-19 | End: 2021-07-29

## 2021-07-19 ASSESSMENT — ENCOUNTER SYMPTOMS
RESPIRATORY NEGATIVE: 1
EYES NEGATIVE: 1
ALLERGIC/IMMUNOLOGIC NEGATIVE: 1
SORE THROAT: 1
GASTROINTESTINAL NEGATIVE: 1

## 2021-07-19 NOTE — PROGRESS NOTES
21     Erica Sotomayor    : 2000 Sex: female   Age: 22 y.o. Chief Complaint   Patient presents with    Pharyngitis    Urinary Retention     feels like not able to empty bladder       Prior to Admission medications    Medication Sig Start Date End Date Taking? Authorizing Provider   amoxicillin-clavulanate (AUGMENTIN) 875-125 MG per tablet Take 1 tablet by mouth 2 times daily for 10 days 21 Yes Rani Caceres DO   PHILITH 0.4-35 MG-MCG per tablet take 1 tablet by mouth once daily 3/14/21  Yes Historical Provider, MD   FLUoxetine (PROZAC) 20 MG capsule Take 1 capsule by mouth daily 2/15/21  Yes Rani Caceres DO   clindamycin (CLEOCIN T) 1 % lotion as needed  20  Yes Historical Provider, MD   EPINEPHrine (EPIPEN 2-ZAN) 0.3 MG/0.3ML SOAJ injection Inject 0.3 mLs into the muscle once for 1 dose Use as directed for allergic reaction 20 Yes Nadiya Isbell DO   Adapalene 0.3 % GEL apply to affected area at bedtime 20  Yes Historical Provider, MD          HPI: Patient evaluated today with some symptoms urinary retention low back discomfort mild throat irritation. Fever chills last night. Temp as high as 103. Today showing evidence of tonsillitis as well as some mild tenderness over the bladder. Initiated Augmentin and we will follow-up culture. Review of Systems   Constitutional: Negative. HENT: Positive for congestion and sore throat. Eyes: Negative. Respiratory: Negative. Gastrointestinal: Negative. Endocrine: Negative. Genitourinary: Positive for urgency. Musculoskeletal: Negative. Skin: Negative. Allergic/Immunologic: Negative. Neurological: Negative. Hematological: Negative. Psychiatric/Behavioral: Negative.                Current Outpatient Medications:     amoxicillin-clavulanate (AUGMENTIN) 875-125 MG per tablet, Take 1 tablet by mouth 2 times daily for 10 days, Disp: 20 tablet, Rfl: 0    PHILITH 0.4-35 MG-MCG per tablet, take 1 tablet by mouth once daily, Disp: , Rfl:     FLUoxetine (PROZAC) 20 MG capsule, Take 1 capsule by mouth daily, Disp: 30 capsule, Rfl: 5    clindamycin (CLEOCIN T) 1 % lotion, as needed , Disp: , Rfl:     EPINEPHrine (EPIPEN 2-ZAN) 0.3 MG/0.3ML SOAJ injection, Inject 0.3 mLs into the muscle once for 1 dose Use as directed for allergic reaction, Disp: 0.3 mL, Rfl: 3    Adapalene 0.3 % GEL, apply to affected area at bedtime, Disp: , Rfl:     Allergies   Allergen Reactions    Peanut-Containing Drug Products        Social History     Tobacco Use    Smoking status: Never Smoker    Smokeless tobacco: Never Used   Substance Use Topics    Alcohol use: Never    Drug use: Never      No past surgical history on file. No family history on file. No past medical history on file. Vitals:    07/19/21 0905   BP: 104/72   Pulse: 107   Temp: 98.7 °F (37.1 °C)   SpO2: 96%   Weight: 172 lb (78 kg)     BP Readings from Last 3 Encounters:   07/19/21 104/72   05/17/21 120/82   05/13/21 100/70        Physical Exam  Vitals and nursing note reviewed. Constitutional:       Appearance: She is well-developed. HENT:      Head: Normocephalic. Right Ear: External ear normal.      Left Ear: External ear normal.      Nose: Nose normal.   Eyes:      Conjunctiva/sclera: Conjunctivae normal.      Pupils: Pupils are equal, round, and reactive to light. Cardiovascular:      Rate and Rhythm: Normal rate. Pulmonary:      Breath sounds: Normal breath sounds. Abdominal:      General: Bowel sounds are normal.      Palpations: Abdomen is soft. Musculoskeletal:         General: Normal range of motion. Cervical back: Normal range of motion and neck supple. Skin:     General: Skin is warm and dry. Neurological:      Mental Status: She is alert and oriented to person, place, and time.    Psychiatric:         Behavior: Behavior normal.     Exam findings reveal some increased drainage posterior pharynx erythematous throat. Mild tenderness over the bladder. Mild low back discomfort. UA showing small amount of blood trace leukocytes. We will follow-up culture. Throat showing evidence of mild tonsillitis. Placed on Augmentin Tylenol Advil and reassess if persistent symptoms. Plan Per Assessment:  Nisa Castle was seen today for pharyngitis and urinary retention. Diagnoses and all orders for this visit:    Urinary retention  -     POCT Urinalysis no Micro  -     Culture, Urine; Future    Acute midline low back pain without sciatica    Sore throat    Other orders  -     amoxicillin-clavulanate (AUGMENTIN) 875-125 MG per tablet; Take 1 tablet by mouth 2 times daily for 10 days            No follow-ups on file. Ashby Severance, DO    Note was generated with the assistance of voice recognition software. Document was reviewed however may contain grammatical errors.

## 2021-07-21 LAB — URINE CULTURE, ROUTINE: NORMAL

## 2021-08-10 ENCOUNTER — OFFICE VISIT (OUTPATIENT)
Dept: PRIMARY CARE CLINIC | Age: 21
End: 2021-08-10
Payer: COMMERCIAL

## 2021-08-10 VITALS
HEART RATE: 84 BPM | OXYGEN SATURATION: 98 % | BODY MASS INDEX: 23.38 KG/M2 | TEMPERATURE: 97.5 F | DIASTOLIC BLOOD PRESSURE: 62 MMHG | HEIGHT: 71 IN | WEIGHT: 167 LBS | SYSTOLIC BLOOD PRESSURE: 114 MMHG

## 2021-08-10 DIAGNOSIS — J30.2 SEASONAL ALLERGIES: ICD-10-CM

## 2021-08-10 DIAGNOSIS — F41.9 ANXIETY: Primary | ICD-10-CM

## 2021-08-10 DIAGNOSIS — R63.5 WEIGHT GAIN: ICD-10-CM

## 2021-08-10 PROCEDURE — 99214 OFFICE O/P EST MOD 30 MIN: CPT | Performed by: FAMILY MEDICINE

## 2021-08-10 NOTE — PROGRESS NOTES
8/10/21     Erica Sotomayor    : 2000 Sex: female   Age: 22 y.o. Chief Complaint   Patient presents with   [address]       Prior to Admission medications    Medication Sig Start Date End Date Taking? Authorizing Provider   Jun [facility] 0.4-35 MG-MCG per tablet take 1 tablet by mouth once daily 3/14/21  Yes Historical Provider, MD   FLUoxetine (PROZAC) 20 MG capsule Take 1 capsule by mouth daily 2/15/21  Yes Bernardino Caceres DO   clindamycin (CLEOCIN T) 1 % lotion as needed  20  Yes Historical Provider, MD   Adapalene 0.3 % GEL apply to affected area at bedtime 20  Yes Historical Provider, MD   EPINEPHrine (EPIPEN 2-ZAN) 0.3 MG/0.3ML SOAJ injection Inject 0.3 mLs into the muscle once for 1 dose Use as directed for allergic reaction 20  Nadiya Isbell DO          HPI: Patient evaluated today with anxiety seasonal allergies pollen and hayfever. If persistent problems with hayfever would consider Depo-Medrol shot. Weight gain has been excellent she is down to 167 pounds and managing very well on the Prozac. Review of Systems   Constitutional: Negative. HENT: Negative. Eyes: Negative. Respiratory: Negative. Gastrointestinal: Negative. Endocrine: Negative. Genitourinary: Negative. Musculoskeletal: Negative. Skin: Negative. Allergic/Immunologic: Negative. Neurological: Negative. Hematological: Negative. Psychiatric/Behavioral: Negative. Today system review stable meds well-tolerated.         Current Outpatient Medications:     PHILITH 0.4-35 MG-MCG per tablet, take 1 tablet by mouth once daily, Disp: , Rfl:     FLUoxetine (PROZAC) 20 MG capsule, Take 1 capsule by mouth daily, Disp: 30 capsule, Rfl: 5    clindamycin (CLEOCIN T) 1 % lotion, as needed , Disp: , Rfl:     Adapalene 0.3 % GEL, apply to affected area at bedtime, Disp: , Rfl:     EPINEPHrine (EPIPEN 2-ZAN) 0.3 MG/0.3ML SOAJ injection, Inject 0.3 mLs into the muscle once for 1 dose Use as directed for allergic reaction, Disp: 0.3 mL, Rfl: 3    Allergies   Allergen Reactions    Peanut-Containing Drug Products        Social History     Tobacco Use    Smoking status: Never Smoker    Smokeless tobacco: Never Used   Substance Use Topics    Alcohol use: Never    Drug use: Never      No past surgical history on file. No family history on file. No past medical history on file. Vitals:    08/10/21 1029   BP: 114/62   Pulse: 84   Temp: 97.5 °F (36.4 °C)   SpO2: 98%   Weight: 167 lb (75.8 kg)   Height: 5' 11\" (1.803 m)     BP Readings from Last 3 Encounters:   08/10/21 114/62   07/19/21 104/72   05/17/21 120/82        Physical Exam  Vitals and nursing note reviewed. Constitutional:       Appearance: She is well-developed. HENT:      Head: Normocephalic. Right Ear: External ear normal.      Left Ear: External ear normal.      Nose: Nose normal.   Eyes:      Conjunctiva/sclera: Conjunctivae normal.      Pupils: Pupils are equal, round, and reactive to light. Cardiovascular:      Rate and Rhythm: Normal rate. Pulmonary:      Breath sounds: Normal breath sounds. Abdominal:      General: Bowel sounds are normal.      Palpations: Abdomen is soft. Musculoskeletal:         General: Normal range of motion. Cervical back: Normal range of motion and neck supple. Skin:     General: Skin is warm and dry. Neurological:      Mental Status: She is alert and oriented to person, place, and time. Psychiatric:         Behavior: Behavior normal.        Present vitals physical examination stable. I will continue with current meds and care. Reassessment with this young lady 3 months and sooner if problems. Plan Per Assessment:  Bennet Jeans was seen today for anxiety and discuss labs. Diagnoses and all orders for this visit:    Anxiety    Seasonal allergies    Weight gain            Return in about 3 months (around 11/10/2021).       Yoly Glze DO Nicki    Note was generated with the assistance of voice recognition software. Document was reviewed however may contain grammatical errors.

## 2021-08-18 PROBLEM — J02.9 SORE THROAT: Status: RESOLVED | Noted: 2021-07-19 | Resolved: 2021-08-18

## 2021-09-30 ENCOUNTER — OFFICE VISIT (OUTPATIENT)
Dept: PRIMARY CARE CLINIC | Age: 21
End: 2021-09-30
Payer: COMMERCIAL

## 2021-09-30 VITALS
SYSTOLIC BLOOD PRESSURE: 120 MMHG | BODY MASS INDEX: 23.99 KG/M2 | TEMPERATURE: 99.2 F | DIASTOLIC BLOOD PRESSURE: 78 MMHG | HEART RATE: 88 BPM | WEIGHT: 172 LBS | OXYGEN SATURATION: 97 %

## 2021-09-30 DIAGNOSIS — R05.9 COUGH: Primary | ICD-10-CM

## 2021-09-30 DIAGNOSIS — J01.90 ACUTE BACTERIAL SINUSITIS: ICD-10-CM

## 2021-09-30 DIAGNOSIS — B96.89 ACUTE BACTERIAL SINUSITIS: ICD-10-CM

## 2021-09-30 DIAGNOSIS — F41.9 ANXIETY: ICD-10-CM

## 2021-09-30 DIAGNOSIS — N92.6 IRREGULAR MENSTRUAL BLEEDING: ICD-10-CM

## 2021-09-30 PROCEDURE — 99214 OFFICE O/P EST MOD 30 MIN: CPT | Performed by: FAMILY MEDICINE

## 2021-09-30 RX ORDER — PREDNISONE 1 MG/1
TABLET ORAL
Qty: 30 TABLET | Refills: 0 | Status: SHIPPED
Start: 2021-09-30 | End: 2021-10-12

## 2021-09-30 RX ORDER — AZITHROMYCIN 250 MG/1
TABLET, FILM COATED ORAL
Qty: 1 PACKET | Refills: 0 | Status: SHIPPED
Start: 2021-09-30 | End: 2021-10-12

## 2021-09-30 ASSESSMENT — ENCOUNTER SYMPTOMS
RESPIRATORY NEGATIVE: 1
ALLERGIC/IMMUNOLOGIC NEGATIVE: 1
GASTROINTESTINAL NEGATIVE: 1
EYES NEGATIVE: 1

## 2021-09-30 NOTE — PROGRESS NOTES
21     Erica Sotomayor    : 2000 Sex: female   Age: 22 y.o. Chief Complaint   Patient presents with    Anxiety    Congestion     has alot of nasal congestion this week. Using otc meds, mucinex off and on    Cough       Prior to Admission medications    Medication Sig Start Date End Date Taking? Authorizing Provider   azithromycin (ZITHROMAX Z-JASKARAN) 250 MG tablet 2 today  Then 1 qd  4 days 21  Yes Casie Caceres DO   predniSONE (DELTASONE) 5 MG tablet 4 tablets daily for 3 days then 3 tablets daily for 3 days then 2 tablets daily for 3 days then 1 tablet daily for 3 days 21  Yes Casie Brass DO Nicki   PHILITH 0.4-35 MG-MCG per tablet take 1 tablet by mouth once daily 3/14/21  Yes Historical Provider, MD   FLUoxetine (PROZAC) 20 MG capsule Take 1 capsule by mouth daily 2/15/21  Yes Casiejerad Caceres DO   clindamycin (CLEOCIN T) 1 % lotion as needed  20  Yes Historical Provider, MD   Adapalene 0.3 % GEL apply to affected area at bedtime 20  Yes Historical Provider, MD   EPINEPHrine (EPIPEN 2-JASKARAN) 0.3 MG/0.3ML SOAJ injection Inject 0.3 mLs into the muscle once for 1 dose Use as directed for allergic reaction 20  Nadiya Isbell DO          HPI: Seen today upper respiratory cough congestion sinus drainage. Mild URI. Will treat with Z-Jaskaran and prednisone and persistent follow-up. Difficulties with anxiety depression and currently on Prozac and improved. We will maintain present dose. Counseling discussed and referral names provided today. Reassess with next visit. Grieving process recent  problems with her grandmother gravely ill at this time. Review of Systems   Constitutional: Negative. HENT: Positive for congestion. Eyes: Negative. Respiratory: Negative. Gastrointestinal: Negative. Endocrine: Negative. Genitourinary: Negative. Musculoskeletal: Negative. Skin: Negative. Allergic/Immunologic: Negative. Neurological: Negative. Hematological: Negative. Psychiatric/Behavioral: Negative. Today systems review overall stable aside from respiratory congestion as noted. Increased anxiety. Current Outpatient Medications:     azithromycin (ZITHROMAX Z-ZAN) 250 MG tablet, 2 today  Then 1 qd  4 days, Disp: 1 packet, Rfl: 0    predniSONE (DELTASONE) 5 MG tablet, 4 tablets daily for 3 days then 3 tablets daily for 3 days then 2 tablets daily for 3 days then 1 tablet daily for 3 days, Disp: 30 tablet, Rfl: 0    PHILITH 0.4-35 MG-MCG per tablet, take 1 tablet by mouth once daily, Disp: , Rfl:     FLUoxetine (PROZAC) 20 MG capsule, Take 1 capsule by mouth daily, Disp: 30 capsule, Rfl: 5    clindamycin (CLEOCIN T) 1 % lotion, as needed , Disp: , Rfl:     Adapalene 0.3 % GEL, apply to affected area at bedtime, Disp: , Rfl:     EPINEPHrine (EPIPEN 2-ZAN) 0.3 MG/0.3ML SOAJ injection, Inject 0.3 mLs into the muscle once for 1 dose Use as directed for allergic reaction, Disp: 0.3 mL, Rfl: 3    Allergies   Allergen Reactions    Peanut-Containing Drug Products        Social History     Tobacco Use    Smoking status: Never Smoker    Smokeless tobacco: Never Used   Substance Use Topics    Alcohol use: Never    Drug use: Never      No past surgical history on file. No family history on file. No past medical history on file. Vitals:    09/30/21 0815   BP: 120/78   Pulse: 88   Temp: 99.2 °F (37.3 °C)   SpO2: 97%   Weight: 172 lb (78 kg)     BP Readings from Last 3 Encounters:   09/30/21 120/78   08/10/21 114/62   07/19/21 104/72        Physical Exam  Vitals and nursing note reviewed. Constitutional:       Appearance: She is well-developed. HENT:      Head: Normocephalic. Right Ear: External ear normal.      Left Ear: External ear normal.      Nose: Congestion present. Eyes:      Conjunctiva/sclera: Conjunctivae normal.      Pupils: Pupils are equal, round, and reactive to light.    Cardiovascular: Rate and Rhythm: Normal rate. Pulmonary:      Breath sounds: Normal breath sounds. Abdominal:      General: Bowel sounds are normal.      Palpations: Abdomen is soft. Musculoskeletal:         General: Normal range of motion. Cervical back: Normal range of motion and neck supple. Skin:     General: Skin is warm and dry. Neurological:      Mental Status: She is alert and oriented to person, place, and time. Psychiatric:         Behavior: Behavior normal.        Physical exam findings are overall stable. Increased upper respiratory congestion as noted. Treatment as noted and then reassess with me next 2 to 3 weeks. Counseling referrals made today. Irregular menstrual bleeding is present and did encourage follow-up with gynecology. Plan Per Assessment:  Timmy Waters was seen today for anxiety, congestion and cough. Diagnoses and all orders for this visit:    Cough  -     azithromycin (ZITHROMAX Z-ZAN) 250 MG tablet; 2 today  Then 1 qd  4 days    Anxiety    Acute bacterial sinusitis  -     azithromycin (ZITHROMAX Z-ZAN) 250 MG tablet; 2 today  Then 1 qd  4 days    Irregular menstrual bleeding    Other orders  -     predniSONE (DELTASONE) 5 MG tablet; 4 tablets daily for 3 days then 3 tablets daily for 3 days then 2 tablets daily for 3 days then 1 tablet daily for 3 days            Return in about 2 weeks (around 10/14/2021). Jasmin De Santiago DO    Note was generated with the assistance of voice recognition software. Document was reviewed however may contain grammatical errors.

## 2021-10-07 RX ORDER — FLUOXETINE HYDROCHLORIDE 20 MG/1
CAPSULE ORAL
Qty: 30 CAPSULE | Refills: 5 | Status: SHIPPED
Start: 2021-10-07 | End: 2021-11-24 | Stop reason: SDUPTHER

## 2021-10-12 ENCOUNTER — OFFICE VISIT (OUTPATIENT)
Dept: PRIMARY CARE CLINIC | Age: 21
End: 2021-10-12
Payer: COMMERCIAL

## 2021-10-12 VITALS
HEIGHT: 71 IN | SYSTOLIC BLOOD PRESSURE: 120 MMHG | DIASTOLIC BLOOD PRESSURE: 80 MMHG | WEIGHT: 172 LBS | TEMPERATURE: 97.1 F | OXYGEN SATURATION: 98 % | BODY MASS INDEX: 24.08 KG/M2 | HEART RATE: 89 BPM

## 2021-10-12 DIAGNOSIS — F41.9 ANXIETY: Primary | ICD-10-CM

## 2021-10-12 DIAGNOSIS — Z23 NEED FOR INFLUENZA VACCINATION: ICD-10-CM

## 2021-10-12 DIAGNOSIS — F43.21 GRIEF: ICD-10-CM

## 2021-10-12 PROCEDURE — 90471 IMMUNIZATION ADMIN: CPT | Performed by: FAMILY MEDICINE

## 2021-10-12 PROCEDURE — 90674 CCIIV4 VAC NO PRSV 0.5 ML IM: CPT | Performed by: FAMILY MEDICINE

## 2021-10-12 PROCEDURE — 99214 OFFICE O/P EST MOD 30 MIN: CPT | Performed by: FAMILY MEDICINE

## 2021-10-12 NOTE — PROGRESS NOTES
10/12/21     Erica Sotomayor    : 2000 Sex: female   Age: 22 y.o. Chief Complaint   Patient presents with    Cough     f/u, feeling better       Prior to Admission medications    Medication Sig Start Date End Date Taking? Authorizing Provider   FLUoxetine (PROZAC) 20 MG capsule take 1 capsule by mouth once daily 10/7/21  Yes Ny Caceres DO   PHILITH 0.4-35 MG-MCG per tablet take 1 tablet by mouth once daily 3/14/21  Yes Historical Provider, MD   clindamycin (CLEOCIN T) 1 % lotion as needed  20  Yes Historical Provider, MD   Adapalene 0.3 % GEL apply to affected area at bedtime 20  Yes Historical Provider, MD   EPINEPHrine (EPIPEN 2-ZAN) 0.3 MG/0.3ML SOAJ injection Inject 0.3 mLs into the muscle once for 1 dose Use as directed for allergic reaction 20  Nadiya Isbell DO          HPI: Patient evaluated today respiratory infection improved recent cough congestion has all settled nicely. We will provide a flu shot today. Grief with recent loss of her grandma and emotional support today. Anxiety controlled on fluoxetine 20 mg a day and will maintain. Back with me in November. Review of Systems   Constitutional: Negative. HENT: Negative. Eyes: Negative. Respiratory: Negative. Gastrointestinal: Negative. Endocrine: Negative. Genitourinary: Negative. Musculoskeletal: Negative. Skin: Negative. Allergic/Immunologic: Negative. Neurological: Negative. Hematological: Negative. Psychiatric/Behavioral: Negative. Today systems review is stable meds well-tolerated continue as prescribed.           Current Outpatient Medications:     FLUoxetine (PROZAC) 20 MG capsule, take 1 capsule by mouth once daily, Disp: 30 capsule, Rfl: 5    PHILITH 0.4-35 MG-MCG per tablet, take 1 tablet by mouth once daily, Disp: , Rfl:     clindamycin (CLEOCIN T) 1 % lotion, as needed , Disp: , Rfl:     Adapalene 0.3 % GEL, apply to affected area at bedtime, Disp: , Rfl:     EPINEPHrine (EPIPEN 2-ZAN) 0.3 MG/0.3ML SOAJ injection, Inject 0.3 mLs into the muscle once for 1 dose Use as directed for allergic reaction, Disp: 0.3 mL, Rfl: 3    Allergies   Allergen Reactions    Peanut-Containing Drug Products        Social History     Tobacco Use    Smoking status: Never Smoker    Smokeless tobacco: Never Used   Substance Use Topics    Alcohol use: Never    Drug use: Never      No past surgical history on file. No family history on file. No past medical history on file. Vitals:    10/12/21 1406   BP: 120/80   Pulse: 89   Temp: 97.1 °F (36.2 °C)   SpO2: 98%   Weight: 172 lb (78 kg)   Height: 5' 11\" (1.803 m)     BP Readings from Last 3 Encounters:   10/12/21 120/80   09/30/21 120/78   08/10/21 114/62        Physical Exam  Vitals and nursing note reviewed. Constitutional:       Appearance: She is well-developed. HENT:      Head: Normocephalic. Right Ear: External ear normal.      Left Ear: External ear normal.      Nose: Nose normal.   Eyes:      Conjunctiva/sclera: Conjunctivae normal.      Pupils: Pupils are equal, round, and reactive to light. Cardiovascular:      Rate and Rhythm: Normal rate. Pulmonary:      Breath sounds: Normal breath sounds. Abdominal:      General: Bowel sounds are normal.      Palpations: Abdomen is soft. Musculoskeletal:         General: Normal range of motion. Cervical back: Normal range of motion and neck supple. Skin:     General: Skin is warm and dry. Neurological:      Mental Status: She is alert and oriented to person, place, and time. Psychiatric:         Behavior: Behavior normal.     Present vitals physical examination stable. I will sit tight with current meds and care. Reassessment November and sooner if problems. Flu shot again today. Plan Per Assessment:  Mitch Neil was seen today for cough.     Diagnoses and all orders for this visit:    Anxiety    Grief    Need for influenza vaccination  -     INFLUENZA, MDCK QUADV, 2 YRS AND OLDER, IM, PF, PREFILL SYR OR SDV, 0.5ML (FLUCELVAX QUADV, PF)    Other orders  -     Cancel: INFLUENZA, QUADV, 3 YRS AND OLDER, IM PF, PREFILL SYR OR SDV, 0.5ML (AFLURIA QUADV, PF)            No follow-ups on file. Vick Bullock DO    Note was generated with the assistance of voice recognition software. Document was reviewed however may contain grammatical errors.

## 2021-10-12 NOTE — PATIENT INSTRUCTIONS
Patient Education        Influenza (Flu) Vaccine (Inactivated or Recombinant): What You Need to Know  Why get vaccinated? Influenza vaccine can prevent influenza (flu). Flu is a contagious disease that spreads around the [place] every year, usually between October and May. Anyone can get the flu, but it is more dangerous for some people. Infants and young children, people 72years of age and older, pregnant women, and people with certain health conditions or a weakened immune system are at greatest risk of flu complications. Pneumonia, bronchitis, sinus infections and ear infections are examples of flu-related complications. If you have a medical condition, such as heart disease, cancer or diabetes, flu can make it worse. Flu can cause fever and chills, sore throat, muscle aches, fatigue, cough, headache, and runny or stuffy nose. Some people may have vomiting and diarrhea, though this is more common in children than adults. Each year, thousands of people in the [facility] die from flu, and many more are hospitalized. Flu vaccine prevents millions of illnesses and flu-related visits to the doctor each year. Influenza vaccine  CDC recommends everyone 10months of age and older get vaccinated every flu season. Children 6 months through 6years of age may need 2 doses during a single flu season. Everyone else needs only 1 dose each flu season. It takes about 2 weeks for protection to develop after vaccination. There are many flu viruses, and they are always changing. Each year a new flu vaccine is made to protect against three or four viruses that are likely to cause disease in the upcoming flu season. Even when the vaccine doesn't exactly match these viruses, it may still provide some protection. Influenza vaccine does not cause flu. Influenza vaccine may be given at the same time as other vaccines.   Talk with your health care provider  Tell your vaccine provider if the person getting the vaccine:  · Has had an allergic reaction after a previous dose of influenza vaccine, or has any severe, life-threatening allergies. · Has ever had Guillain-Barré Syndrome (also called GBS). In some cases, your health care provider may decide to postpone influenza vaccination to a future visit. People with minor illnesses, such as a cold, may be vaccinated. People who are moderately or severely ill should usually wait until they recover before getting influenza vaccine. Your health care provider can give you more information. Risks of a vaccine reaction  · Soreness, redness, and swelling where shot is given, fever, muscle aches, and headache can happen after influenza vaccine. · There may be a very small increased risk of Guillain-Barré Syndrome (GBS) after inactivated influenza vaccine (the flu shot). [facility] children who get the flu shot along with pneumococcal vaccine (PCV13), and/or DTaP vaccine at the same time might be slightly more likely to have a seizure caused by fever. Tell your health care provider if a child who is getting flu vaccine has ever had a seizure. People sometimes faint after medical procedures, including vaccination. Tell your provider if you feel dizzy or have vision changes or ringing in the ears. As with any medicine, there is a very remote chance of a vaccine causing a severe allergic reaction, other serious injury, or death. What if there is a serious problem? An allergic reaction could occur after the vaccinated person leaves the clinic. If you see signs of a severe allergic reaction (hives, swelling of the face and throat, difficulty breathing, a fast heartbeat, dizziness, or weakness), call 9-1-1 and get the person to the nearest hospital.  For other signs that concern you, call your health care provider. Adverse reactions should be reported to the Vaccine Adverse Event Reporting System (VAERS).  Your health care provider will usually file this report, or you can do it yourself. Visit the VAERS website at www.vaers. hhs.gov or call 1-639.319.9244. VAERS is only for reporting reactions, and VAERS staff do not give medical advice. The National Vaccine Injury Compensation Program  The National Vaccine Injury Compensation Program (VICP) is a federal program that was created to compensate people who may have been injured by certain vaccines. Visit the VICP website at www.[facility].gov/vaccinecompensation or call 1-244.663.4042 to learn about the program and about filing a claim. There is a time limit to file a claim for compensation. How can I learn more? · Ask your healthcare provider. · Call your local or state health department. · Contact the Centers for Disease Control and Prevention (CDC):  ? Call 1-980.483.1530 (1-800-CDC-INFO) or  ? Visit CDC's website at www.cdc.gov/flu  Vaccine Information Statement (Interim)  Inactivated Influenza Vaccine  8/15/2019  [address] [id number]  Department of Health and Human Services  Centers for Disease Control and Prevention  Many Vaccine Information Statements are available in [place] and other languages. See www.immunize.org/vis. Muchas hojas de información sobre vacunas están disponibles en español y en otros idiomas. Visite www.immunize.org/vis. Care instructions adapted under license by [facility] ([facility]). If you have questions about a medical condition or this instruction, always ask your healthcare professional. [place] [address] any warranty or liability for your use of this information.

## 2021-10-30 PROBLEM — R05.9 COUGH: Status: RESOLVED | Noted: 2021-09-30 | Resolved: 2021-10-30

## 2021-11-01 ENCOUNTER — OFFICE VISIT (OUTPATIENT)
Dept: CHIROPRACTIC MEDICINE | Age: 21
End: 2021-11-01
Payer: COMMERCIAL

## 2021-11-01 VITALS — TEMPERATURE: 98 F | HEART RATE: 83 BPM

## 2021-11-01 DIAGNOSIS — G89.29 CHRONIC MIDLINE LOW BACK PAIN WITHOUT SCIATICA: ICD-10-CM

## 2021-11-01 DIAGNOSIS — M62.830 MUSCLE SPASM OF BACK: ICD-10-CM

## 2021-11-01 DIAGNOSIS — M54.50 CHRONIC MIDLINE LOW BACK PAIN WITHOUT SCIATICA: ICD-10-CM

## 2021-11-01 DIAGNOSIS — M54.2 CERVICALGIA: Primary | ICD-10-CM

## 2021-11-01 DIAGNOSIS — M54.04 PANNICULITIS AFFECTING REGIONS OF NECK AND BACK, THORACIC REGION: ICD-10-CM

## 2021-11-01 PROCEDURE — 99213 OFFICE O/P EST LOW 20 MIN: CPT | Performed by: CHIROPRACTOR

## 2021-11-01 PROCEDURE — 97014 ELECTRIC STIMULATION THERAPY: CPT | Performed by: CHIROPRACTOR

## 2021-11-01 PROCEDURE — 98941 CHIROPRACT MANJ 3-4 REGIONS: CPT | Performed by: CHIROPRACTOR

## 2021-11-01 NOTE — PROGRESS NOTES
21  Erica Sotomayor : 2000 Sex: female  Age: 22 y.o. Chief Complaint   Patient presents with    Neck Pain    Back Pain       HPI:   The patient returns today for follow-up. Have not seen her in several months. She states over the past few weeks, she has been under Lots of stress,, death in the family, working several jobs, school. As a result she is having increased neck and upper back pain, pressure. Feels like it needs to be cracked  NO UE/LE pain. Suboccipital headaches to temples    LBP not as bad as it was before, but is present. Current Outpatient Medications:     FLUoxetine (PROZAC) 20 MG capsule, take 1 capsule by mouth once daily, Disp: 30 capsule, Rfl: 5    PHILITH 0.4-35 MG-MCG per tablet, take 1 tablet by mouth once daily, Disp: , Rfl:     clindamycin (CLEOCIN T) 1 % lotion, as needed , Disp: , Rfl:     EPINEPHrine (EPIPEN 2-ZAN) 0.3 MG/0.3ML SOAJ injection, Inject 0.3 mLs into the muscle once for 1 dose Use as directed for allergic reaction, Disp: 0.3 mL, Rfl: 3    Adapalene 0.3 % GEL, apply to affected area at bedtime, Disp: , Rfl:     Exam:   Vitals:    21 1554   Pulse: 83   Temp: 98 °F (36.7 °C)       She appears well. No apparent distress. Alert and oriented x3. Ambulating without assistance. Cervical active range of motion are mildly limited in all planes. Cervical flexion and chin retraction both increase pain identified in the suboccipital regions. There are active trigger points in the bilateral suboccipital group, bilateral trapezius today. The neck is supple and nontender in the midline. No midline pain in the thoracic or lumbar regions. Manual muscle testing reveals 5/5 strength throughout the upper and lower extremity indicator muscles. Reflexes are +2 and symmetrical throughout the upper and lower extremities. Sensation light touch WNL throughout the upper extremities and distal lower extremity dermatomes.   Radial and posterior

## 2021-11-08 ENCOUNTER — OFFICE VISIT (OUTPATIENT)
Dept: CHIROPRACTIC MEDICINE | Age: 21
End: 2021-11-08
Payer: COMMERCIAL

## 2021-11-08 ENCOUNTER — OFFICE VISIT (OUTPATIENT)
Dept: PRIMARY CARE CLINIC | Age: 21
End: 2021-11-08
Payer: COMMERCIAL

## 2021-11-08 VITALS
DIASTOLIC BLOOD PRESSURE: 70 MMHG | HEART RATE: 81 BPM | HEIGHT: 71 IN | BODY MASS INDEX: 23.66 KG/M2 | OXYGEN SATURATION: 97 % | SYSTOLIC BLOOD PRESSURE: 120 MMHG | WEIGHT: 169 LBS | TEMPERATURE: 99.1 F

## 2021-11-08 DIAGNOSIS — R53.83 OTHER FATIGUE: ICD-10-CM

## 2021-11-08 DIAGNOSIS — M54.04 PANNICULITIS AFFECTING REGIONS OF NECK AND BACK, THORACIC REGION: ICD-10-CM

## 2021-11-08 DIAGNOSIS — M62.830 MUSCLE SPASM OF BACK: ICD-10-CM

## 2021-11-08 DIAGNOSIS — G89.29 CHRONIC MIDLINE LOW BACK PAIN WITHOUT SCIATICA: ICD-10-CM

## 2021-11-08 DIAGNOSIS — F41.9 ANXIETY: ICD-10-CM

## 2021-11-08 DIAGNOSIS — M54.2 CERVICALGIA: Primary | ICD-10-CM

## 2021-11-08 DIAGNOSIS — M54.50 CHRONIC MIDLINE LOW BACK PAIN WITHOUT SCIATICA: ICD-10-CM

## 2021-11-08 DIAGNOSIS — R53.83 OTHER FATIGUE: Primary | ICD-10-CM

## 2021-11-08 DIAGNOSIS — F43.21 GRIEF: ICD-10-CM

## 2021-11-08 LAB
ALBUMIN SERPL-MCNC: 4 G/DL (ref 3.5–5.2)
ALP BLD-CCNC: 50 U/L (ref 35–104)
ALT SERPL-CCNC: 13 U/L (ref 0–32)
ANION GAP SERPL CALCULATED.3IONS-SCNC: 8 MMOL/L (ref 7–16)
AST SERPL-CCNC: 20 U/L (ref 0–31)
BASOPHILS ABSOLUTE: 0.02 E9/L (ref 0–0.2)
BASOPHILS RELATIVE PERCENT: 0.3 % (ref 0–2)
BILIRUB SERPL-MCNC: 0.4 MG/DL (ref 0–1.2)
BUN BLDV-MCNC: 18 MG/DL (ref 6–20)
CALCIUM SERPL-MCNC: 9.5 MG/DL (ref 8.6–10.2)
CHLORIDE BLD-SCNC: 100 MMOL/L (ref 98–107)
CO2: 27 MMOL/L (ref 22–29)
CREAT SERPL-MCNC: 0.9 MG/DL (ref 0.5–1)
EOSINOPHILS ABSOLUTE: 0.07 E9/L (ref 0.05–0.5)
EOSINOPHILS RELATIVE PERCENT: 0.9 % (ref 0–6)
GFR AFRICAN AMERICAN: >60
GFR NON-AFRICAN AMERICAN: >60 ML/MIN/1.73
GLUCOSE BLD-MCNC: 91 MG/DL (ref 74–99)
HCT VFR BLD CALC: 40.8 % (ref 34–48)
HEMOGLOBIN: 13.3 G/DL (ref 11.5–15.5)
IMMATURE GRANULOCYTES #: 0.02 E9/L
IMMATURE GRANULOCYTES %: 0.3 % (ref 0–5)
LYMPHOCYTES ABSOLUTE: 2.7 E9/L (ref 1.5–4)
LYMPHOCYTES RELATIVE PERCENT: 34.7 % (ref 20–42)
MCH RBC QN AUTO: 29.2 PG (ref 26–35)
MCHC RBC AUTO-ENTMCNC: 32.6 % (ref 32–34.5)
MCV RBC AUTO: 89.7 FL (ref 80–99.9)
MONOCYTES ABSOLUTE: 0.66 E9/L (ref 0.1–0.95)
MONOCYTES RELATIVE PERCENT: 8.5 % (ref 2–12)
NEUTROPHILS ABSOLUTE: 4.31 E9/L (ref 1.8–7.3)
NEUTROPHILS RELATIVE PERCENT: 55.3 % (ref 43–80)
PDW BLD-RTO: 12.1 FL (ref 11.5–15)
PLATELET # BLD: 306 E9/L (ref 130–450)
PMV BLD AUTO: 11.2 FL (ref 7–12)
POTASSIUM SERPL-SCNC: 4.5 MMOL/L (ref 3.5–5)
RBC # BLD: 4.55 E12/L (ref 3.5–5.5)
SODIUM BLD-SCNC: 135 MMOL/L (ref 132–146)
T4 TOTAL: 7.3 MCG/DL (ref 4.5–11.7)
TOTAL PROTEIN: 6.7 G/DL (ref 6.4–8.3)
TSH SERPL DL<=0.05 MIU/L-ACNC: 0.66 UIU/ML (ref 0.27–4.2)
WBC # BLD: 7.8 E9/L (ref 4.5–11.5)

## 2021-11-08 PROCEDURE — 99214 OFFICE O/P EST MOD 30 MIN: CPT | Performed by: FAMILY MEDICINE

## 2021-11-08 PROCEDURE — 97014 ELECTRIC STIMULATION THERAPY: CPT | Performed by: CHIROPRACTOR

## 2021-11-08 PROCEDURE — 98941 CHIROPRACT MANJ 3-4 REGIONS: CPT | Performed by: CHIROPRACTOR

## 2021-11-08 RX ORDER — CLINDAMYCIN PHOSPHATE 20 MG/G
CREAM VAGINAL
COMMUNITY
Start: 2021-11-03 | End: 2022-03-23

## 2021-11-08 ASSESSMENT — ENCOUNTER SYMPTOMS
EYES NEGATIVE: 1
RESPIRATORY NEGATIVE: 1
ALLERGIC/IMMUNOLOGIC NEGATIVE: 1
GASTROINTESTINAL NEGATIVE: 1

## 2021-11-08 NOTE — PROGRESS NOTES
21     Erica Sotomayor    : 2000 Sex: female   Age: 22 y.o. Chief Complaint   Patient presents with    Anxiety     feels very scatter brained still , not much motivation    Neck Pain     did go see chiropractics last week       Prior to Admission medications    Medication Sig Start Date End Date Taking? Authorizing Provider   clindamycin (CLEOCIN) 2 % vaginal cream insert 1 applicatorful vaginally daily 11/3/21  Yes Historical Provider, MD   FLUoxetine (PROZAC) 20 MG capsule take 1 capsule by mouth once daily 10/7/21  Yes Margarita Mota DO   PHILITH 0.4-35 MG-MCG per tablet take 1 tablet by mouth once daily 3/14/21  Yes Historical Provider, MD   clindamycin (CLEOCIN T) 1 % lotion as needed  20  Yes Historical Provider, MD   Adapalene 0.3 % GEL apply to affected area at bedtime 20  Yes Historical Provider, MD   EPINEPHrine (EPIPEN 2-ZAN) 0.3 MG/0.3ML SOAJ injection Inject 0.3 mLs into the muscle once for 1 dose Use as directed for allergic reaction 20  Nadiya Isbell DO          HPI: Patient evaluated today with anxiety group fatigue medical follow-up. Overall doing well with current medications. Fluoxetine we did adjust to 40 mg daily today and then I will recheck her on next 10 weeks. Systems review otherwise is stable. Baseline labs to be completed as well as CBC CMP and thyroid          Review of Systems   Constitutional: Negative. HENT: Negative. Eyes: Negative. Respiratory: Negative. Gastrointestinal: Negative. Endocrine: Negative. Genitourinary: Negative. Musculoskeletal: Negative. Skin: Negative. Allergic/Immunologic: Negative. Neurological: Negative. Hematological: Negative. Psychiatric/Behavioral: Negative. Today systems review is overall stable aside from complaints of continued anxiety grief and fatigue. Difficulties with concentration. Medications adjusted today.           Current Outpatient Medications:   clindamycin (CLEOCIN) 2 % vaginal cream, insert 1 applicatorful vaginally daily, Disp: , Rfl:     FLUoxetine (PROZAC) 20 MG capsule, take 1 capsule by mouth once daily, Disp: 30 capsule, Rfl: 5    PHILITH 0.4-35 MG-MCG per tablet, take 1 tablet by mouth once daily, Disp: , Rfl:     clindamycin (CLEOCIN T) 1 % lotion, as needed , Disp: , Rfl:     Adapalene 0.3 % GEL, apply to affected area at bedtime, Disp: , Rfl:     EPINEPHrine (EPIPEN 2-ZAN) 0.3 MG/0.3ML SOAJ injection, Inject 0.3 mLs into the muscle once for 1 dose Use as directed for allergic reaction, Disp: 0.3 mL, Rfl: 3    Allergies   Allergen Reactions    Peanut-Containing Drug Products        Social History     Tobacco Use    Smoking status: Never Smoker    Smokeless tobacco: Never Used   Substance Use Topics    Alcohol use: Never    Drug use: Never      No past surgical history on file. No family history on file. No past medical history on file. Vitals:    11/08/21 1547   BP: 120/70   Pulse: 81   Temp: 99.1 °F (37.3 °C)   SpO2: 97%   Weight: 169 lb (76.7 kg)   Height: 5' 11\" (1.803 m)     BP Readings from Last 3 Encounters:   11/08/21 120/70   10/12/21 120/80   09/30/21 120/78        Physical Exam  Vitals and nursing note reviewed. Constitutional:       Appearance: She is well-developed. HENT:      Head: Normocephalic. Right Ear: External ear normal.      Left Ear: External ear normal.      Nose: Nose normal.   Eyes:      Conjunctiva/sclera: Conjunctivae normal.      Pupils: Pupils are equal, round, and reactive to light. Cardiovascular:      Rate and Rhythm: Normal rate. Pulmonary:      Breath sounds: Normal breath sounds. Abdominal:      General: Bowel sounds are normal.      Palpations: Abdomen is soft. Musculoskeletal:         General: Normal range of motion. Cervical back: Normal range of motion and neck supple. Skin:     General: Skin is warm and dry.    Neurological:      Mental Status: She is alert and oriented to person, place, and time. Psychiatric:         Behavior: Behavior normal.        Today's vitals physical examination stable. Medications as prescribed. Reassessment 2 weeks and sooner if need be. Plan Per Assessment:  Mitch Neil was seen today for anxiety and neck pain. Diagnoses and all orders for this visit:    Other fatigue  -     CBC Auto Differential; Future  -     Comprehensive Metabolic Panel; Future  -     T4; Future  -     TSH without Reflex; Future    Anxiety  -     CBC Auto Differential; Future  -     Comprehensive Metabolic Panel; Future  -     T4; Future  -     TSH without Reflex; Future    Grief            Return in about 2 weeks (around 11/22/2021). Bushra Hood DO    Note was generated with the assistance of voice recognition software. Document was reviewed however may contain grammatical errors.

## 2021-11-08 NOTE — PROGRESS NOTES
21  Erica Sotomayor : 2000 Sex: female  Age: 22 y.o. Chief Complaint   Patient presents with    Neck Pain    Back Pain       HPI:   Pain is has improved. On average, pain is perceived as moderate (4-6 pain scale). Still feeling it in the neck and upper back today. Saw Dr. Rosemary Lechuga immediately prior to this OV      Current Outpatient Medications:     clindamycin (CLEOCIN) 2 % vaginal cream, insert 1 applicatorful vaginally daily, Disp: , Rfl:     FLUoxetine (PROZAC) 20 MG capsule, take 1 capsule by mouth once daily, Disp: 30 capsule, Rfl: 5    PHILITH 0.4-35 MG-MCG per tablet, take 1 tablet by mouth once daily, Disp: , Rfl:     clindamycin (CLEOCIN T) 1 % lotion, as needed , Disp: , Rfl:     EPINEPHrine (EPIPEN 2-ZAN) 0.3 MG/0.3ML SOAJ injection, Inject 0.3 mLs into the muscle once for 1 dose Use as directed for allergic reaction, Disp: 0.3 mL, Rfl: 3    Adapalene 0.3 % GEL, apply to affected area at bedtime, Disp: , Rfl:     Exam:   There were no vitals filed for this visit. Vitals in chart from  PCP visit. Reviewed. Multiple trigger points bilateral trapezius today. No midline pain. There are hypertonic and tender fibers noted today in the cervical, thoracic and lumbar paraspinal muscles. Joint fixation is noted with motion screening at C2-3, C5-6, C7-T1, T4-6, L5-S1 and bilateral SI joints. Erica was seen today for neck pain and back pain. Diagnoses and all orders for this visit:    Cervicalgia    Panniculitis affecting regions of neck and back, thoracic region    Muscle spasm of back    Chronic midline low back pain without sciatica        Treatment Plan:    EMS with heat to the cervicothoracic region for 15 minutes to address muscle spasm/hypertension and alleviate pain. Diversified manipulation listed cervical, thoracic and mechanically assisted manipulation to the lumbar and SI segments.   Tolerated well    Seen By:  Garfield Millard DC

## 2021-11-15 ENCOUNTER — OFFICE VISIT (OUTPATIENT)
Dept: CHIROPRACTIC MEDICINE | Age: 21
End: 2021-11-15
Payer: COMMERCIAL

## 2021-11-15 VITALS — HEART RATE: 83 BPM | TEMPERATURE: 97.6 F | OXYGEN SATURATION: 98 %

## 2021-11-15 DIAGNOSIS — M54.2 CERVICALGIA: Primary | ICD-10-CM

## 2021-11-15 DIAGNOSIS — G89.29 CHRONIC MIDLINE LOW BACK PAIN WITHOUT SCIATICA: ICD-10-CM

## 2021-11-15 DIAGNOSIS — M54.50 CHRONIC MIDLINE LOW BACK PAIN WITHOUT SCIATICA: ICD-10-CM

## 2021-11-15 DIAGNOSIS — M62.830 MUSCLE SPASM OF BACK: ICD-10-CM

## 2021-11-15 DIAGNOSIS — M54.04 PANNICULITIS AFFECTING REGIONS OF NECK AND BACK, THORACIC REGION: ICD-10-CM

## 2021-11-15 PROCEDURE — 99999 PR OFFICE/OUTPT VISIT,PROCEDURE ONLY: CPT | Performed by: CHIROPRACTOR

## 2021-11-15 PROCEDURE — 97014 ELECTRIC STIMULATION THERAPY: CPT | Performed by: CHIROPRACTOR

## 2021-11-15 PROCEDURE — 98941 CHIROPRACT MANJ 3-4 REGIONS: CPT | Performed by: CHIROPRACTOR

## 2021-11-15 NOTE — PROGRESS NOTES
11/15/21  Erica Sotomayor : 2000 Sex: female  Age: 22 y.o. Chief Complaint   Patient presents with    Neck Pain    Back Pain       HPI:   Continues with neck and upper back tightness and stiffness, nothing new there. Got some sciatic nerve ticking on the right side today, happened once or twice today while at hospital.  She states if she sits with her legs crossed and contracts for gluteal she can get some clicking and popping in her back. Current Outpatient Medications:     clindamycin (CLEOCIN) 2 % vaginal cream, insert 1 applicatorful vaginally daily, Disp: , Rfl:     FLUoxetine (PROZAC) 20 MG capsule, take 1 capsule by mouth once daily, Disp: 30 capsule, Rfl: 5    PHILITH 0.4-35 MG-MCG per tablet, take 1 tablet by mouth once daily, Disp: , Rfl:     clindamycin (CLEOCIN T) 1 % lotion, as needed , Disp: , Rfl:     EPINEPHrine (EPIPEN 2-ZAN) 0.3 MG/0.3ML SOAJ injection, Inject 0.3 mLs into the muscle once for 1 dose Use as directed for allergic reaction, Disp: 0.3 mL, Rfl: 3    Adapalene 0.3 % GEL, apply to affected area at bedtime, Disp: , Rfl:     Exam:   Vitals:    11/15/21 1518   Pulse: 83   Temp: 97.6 °F (36.4 °C)   SpO2: 98%       She has midline tenderness over the S2 spinous today. Nontender SI joints. No sciatic notch tenderness. Active trigger points bilateral trapezius. No midline pain in the cervical or thoracic regions. There are hypertonic and tender fibers noted today in the lumbar, thoracic and cervical paraspinal muscles. Joint fixation is noted with motion screening at L5-S1, bilateral SI joints, T5-7, C6-7. Erica was seen today for neck pain and back pain.     Diagnoses and all orders for this visit:    Cervicalgia    Panniculitis affecting regions of neck and back, thoracic region    Muscle spasm of back    Chronic midline low back pain without sciatica        Treatment Plan:  EMS with heat to the cervicothoracic region for 15 minutes to address muscle spasm/hypertension and alleviate pain. [place] flexion distraction manipulation to the L 5 segment using protocol 2 was performed today. Mechanically assisted manipulation was performed to the segment listed in the SI joints. Diversified manipulation to the listed cervical, thoracic segments.     She will follow up as needed for further care, as she is at the end of semester and does not know her schedule      Seen By:  Francis Serrano DC

## 2021-11-24 ENCOUNTER — OFFICE VISIT (OUTPATIENT)
Dept: PRIMARY CARE CLINIC | Age: 21
End: 2021-11-24
Payer: COMMERCIAL

## 2021-11-24 VITALS
HEART RATE: 94 BPM | WEIGHT: 170 LBS | BODY MASS INDEX: 23.8 KG/M2 | SYSTOLIC BLOOD PRESSURE: 114 MMHG | TEMPERATURE: 97.6 F | DIASTOLIC BLOOD PRESSURE: 64 MMHG | HEIGHT: 71 IN | OXYGEN SATURATION: 97 %

## 2021-11-24 DIAGNOSIS — F43.21 GRIEF: ICD-10-CM

## 2021-11-24 DIAGNOSIS — F41.9 ANXIETY: Primary | ICD-10-CM

## 2021-11-24 PROCEDURE — 99213 OFFICE O/P EST LOW 20 MIN: CPT | Performed by: FAMILY MEDICINE

## 2021-11-24 RX ORDER — FLUOXETINE HYDROCHLORIDE 40 MG/1
CAPSULE ORAL
Qty: 30 CAPSULE | Refills: 2 | Status: SHIPPED
Start: 2021-11-24 | End: 2022-02-14

## 2021-11-24 RX ORDER — NORGESTIMATE AND ETHINYL ESTRADIOL 0.25-0.035
KIT ORAL
COMMUNITY
Start: 2021-11-18

## 2021-11-24 NOTE — PROGRESS NOTES
21     Erica Sotomayor    : 2000 Sex: female   Age: 22 y.o. Chief Complaint   Patient presents with    Discuss Labs    Anxiety       Prior to Admission medications    Medication Sig Start Date End Date Taking? Authorizing Provider   norgestimate-ethinyl estradiol (ORTHO-CYCLEN) 0.25-35 MG-MCG per tablet take 1 tablet by mouth once daily 21  Yes Historical Provider, MD   FLUoxetine (PROZAC) 40 MG capsule take 1 capsule by mouth once daily 21  Yes Marleny Caceres DO   clindamycin (CLEOCIN) 2 % vaginal cream insert 1 applicatorful vaginally daily 11/3/21  Yes Historical Provider, MD   clindamycin (CLEOCIN T) 1 % lotion as needed  20  Yes Historical Provider, MD   Adapalene 0.3 % GEL apply to affected area at bedtime 20  Yes Historical Provider, MD   EPINEPHrine (EPIPEN 2-ZAN) 0.3 MG/0.3ML SOAJ injection Inject 0.3 mLs into the muscle once for 1 dose Use as directed for allergic reaction 20  Nadiya Isbell DO          HPI: Patient presents this morning issues of grief and anxiety. She has improved on the Prozac at 40 a day. We will continue with this dose. Medications reviewed continue as prescribed. Review of Systems   Constitutional: Negative. HENT: Negative. Eyes: Negative. Respiratory: Negative. Gastrointestinal: Negative. Endocrine: Negative. Genitourinary: Negative. Musculoskeletal: Negative. Skin: Negative. Allergic/Immunologic: Negative. Neurological: Negative. Hematological: Negative. Psychiatric/Behavioral: Negative. Today systems review stable meds as prescribed.         Current Outpatient Medications:     norgestimate-ethinyl estradiol (ORTHO-CYCLEN) 0.25-35 MG-MCG per tablet, take 1 tablet by mouth once daily, Disp: , Rfl:     FLUoxetine (PROZAC) 40 MG capsule, take 1 capsule by mouth once daily, Disp: 30 capsule, Rfl: 2    clindamycin (CLEOCIN) 2 % vaginal cream, insert 1 applicatorful vaginally daily, Disp: , Rfl:     clindamycin (CLEOCIN T) 1 % lotion, as needed , Disp: , Rfl:     Adapalene 0.3 % GEL, apply to affected area at bedtime, Disp: , Rfl:     EPINEPHrine (EPIPEN 2-ZAN) 0.3 MG/0.3ML SOAJ injection, Inject 0.3 mLs into the muscle once for 1 dose Use as directed for allergic reaction, Disp: 0.3 mL, Rfl: 3    Allergies   Allergen Reactions    Peanut-Containing Drug Products        Social History     Tobacco Use    Smoking status: Never Smoker    Smokeless tobacco: Never Used   Substance Use Topics    Alcohol use: Never    Drug use: Never      No past surgical history on file. No family history on file. No past medical history on file. Vitals:    11/24/21 0807   BP: 114/64   Pulse: 94   Temp: 97.6 °F (36.4 °C)   SpO2: 97%   Weight: 170 lb (77.1 kg)   Height: 5' 11\" (1.803 m)     BP Readings from Last 3 Encounters:   11/24/21 114/64   11/08/21 120/70   10/12/21 120/80        Physical Exam  Vitals and nursing note reviewed. Constitutional:       Appearance: She is well-developed. HENT:      Head: Normocephalic. Right Ear: External ear normal.      Left Ear: External ear normal.      Nose: Nose normal.   Eyes:      Conjunctiva/sclera: Conjunctivae normal.      Pupils: Pupils are equal, round, and reactive to light. Cardiovascular:      Rate and Rhythm: Normal rate. Pulmonary:      Breath sounds: Normal breath sounds. Abdominal:      General: Bowel sounds are normal.      Palpations: Abdomen is soft. Musculoskeletal:         General: Normal range of motion. Cervical back: Normal range of motion and neck supple. Skin:     General: Skin is warm and dry. Neurological:      Mental Status: She is alert and oriented to person, place, and time. Psychiatric:         Behavior: Behavior normal.     Present vitals physical examination stable. We will maintain present meds and care. Reassessment 1 month sooner if problems. Prozac has been adjusted to 40 mg a day. Exercise encouraged. 1 month follow-up. Plan Per Assessment:  Herrera Moon was seen today for discuss labs and anxiety. Diagnoses and all orders for this visit:    Anxiety    Grief    Other orders  -     FLUoxetine (PROZAC) 40 MG capsule; take 1 capsule by mouth once daily            Return in about 4 weeks (around 12/22/2021). Delia Bullard DO    Note was generated with the assistance of voice recognition software. Document was reviewed however may contain grammatical errors.

## 2021-12-17 ENCOUNTER — OFFICE VISIT (OUTPATIENT)
Dept: FAMILY MEDICINE CLINIC | Age: 21
End: 2021-12-17
Payer: COMMERCIAL

## 2021-12-17 VITALS
TEMPERATURE: 98.4 F | HEART RATE: 81 BPM | WEIGHT: 170 LBS | RESPIRATION RATE: 16 BRPM | BODY MASS INDEX: 23.8 KG/M2 | OXYGEN SATURATION: 97 % | HEIGHT: 71 IN | SYSTOLIC BLOOD PRESSURE: 116 MMHG | DIASTOLIC BLOOD PRESSURE: 64 MMHG

## 2021-12-17 DIAGNOSIS — J00 ACUTE RHINITIS: ICD-10-CM

## 2021-12-17 DIAGNOSIS — N39.0 URINARY TRACT INFECTION WITH HEMATURIA, SITE UNSPECIFIED: Primary | ICD-10-CM

## 2021-12-17 DIAGNOSIS — R30.0 DYSURIA: ICD-10-CM

## 2021-12-17 DIAGNOSIS — J02.9 SORE THROAT: ICD-10-CM

## 2021-12-17 DIAGNOSIS — R31.9 URINARY TRACT INFECTION WITH HEMATURIA, SITE UNSPECIFIED: Primary | ICD-10-CM

## 2021-12-17 LAB
APPEARANCE FLUID: ABNORMAL
BILIRUBIN, POC: NEGATIVE
BLOOD URINE, POC: ABNORMAL
CLARITY, POC: ABNORMAL
COLOR, POC: YELLOW
GLUCOSE URINE, POC: NEGATIVE
KETONES, POC: NEGATIVE
LEUKOCYTE EST, POC: ABNORMAL
NITRITE, POC: NEGATIVE
PH, POC: 7
PROTEIN, POC: ABNORMAL
S PYO AG THROAT QL: NORMAL
SPECIFIC GRAVITY, POC: 1.03
UROBILINOGEN, POC: 0.2

## 2021-12-17 PROCEDURE — 81002 URINALYSIS NONAUTO W/O SCOPE: CPT | Performed by: NURSE PRACTITIONER

## 2021-12-17 PROCEDURE — 99214 OFFICE O/P EST MOD 30 MIN: CPT | Performed by: NURSE PRACTITIONER

## 2021-12-17 PROCEDURE — 87880 STREP A ASSAY W/OPTIC: CPT | Performed by: NURSE PRACTITIONER

## 2021-12-17 RX ORDER — CEFDINIR 300 MG/1
300 CAPSULE ORAL 2 TIMES DAILY
Qty: 14 CAPSULE | Refills: 0 | Status: SHIPPED
Start: 2021-12-17 | End: 2021-12-23 | Stop reason: CLARIF

## 2021-12-17 NOTE — PATIENT INSTRUCTIONS
Patient Education        Urinary Tract Infection (UTI) in Women: Care Instructions  Overview     A urinary tract infection, or UTI, is a general term for an infection anywhere between the kidneys and the urethra (where urine comes out). Most UTIs are bladder infections. They often cause pain or burning when you urinate. UTIs are caused by bacteria and can be cured with antibiotics. Be sure to complete your treatment so that the infection does not get worse. Follow-up care is a key part of your treatment and safety. Be sure to make and go to all appointments, and call your doctor if you are having problems. It's also a good idea to know your test results and keep a list of the medicines you take. How can you care for yourself at home? · Take your antibiotics as directed. Do not stop taking them just because you feel better. You need to take the full course of antibiotics. · Drink extra water and other fluids for the next day or two. This will help make the urine less concentrated and help wash out the bacteria that are causing the infection. (If you have kidney, heart, or liver disease and have to limit fluids, talk with your doctor before you increase the amount of fluids you drink.)  · Avoid drinks that are carbonated or have caffeine. They can irritate the bladder. · Urinate often. Try to empty your bladder each time. · To relieve pain, take a hot bath or lay a heating pad set on low over your lower belly or genital area. Never go to sleep with a heating pad in place. To prevent UTIs  · Drink plenty of water each day. This helps you urinate often, which clears bacteria from your system. (If you have kidney, heart, or liver disease and have to limit fluids, talk with your doctor before you increase the amount of fluids you drink.)  · Urinate when you need to. · If you are sexually active, urinate right after you have sex. · Change sanitary pads often.   · Avoid douches, bubble baths, feminine hygiene sprays, and other feminine hygiene products that have deodorants. · After going to the bathroom, wipe from front to back. When should you call for help? Call your doctor now or seek immediate medical care if:    · Symptoms such as fever, chills, nausea, or vomiting get worse or appear for the first time.     · You have new pain in your back just below your rib cage. This is called flank pain.     · There is new blood or pus in your urine.     · You have any problems with your antibiotic medicine. Watch closely for changes in your health, and be sure to contact your doctor if:    · You are not getting better after taking an antibiotic for 2 days.     · Your symptoms go away but then come back. Where can you learn more? Go to [email or web address]://[employer] org and sign in to your [employer] account. Enter V990 in the [employer] box to learn more about \"Urinary Tract Infection (UTI) in Women: Care Instructions. \"     If you do not have an account, please click on the \"Sign Up Now\" link. Current as of: February 10, 2021               Content Version: 13.0  © 0633-0897 Healthwise, Incorporated. Care instructions adapted under license by [facility] ([facility]). If you have questions about a medical condition or this instruction, always ask your healthcare professional. [address] any warranty or liability for your use of this information.

## 2021-12-17 NOTE — PROGRESS NOTES
21  Catracho Edge : 2000 Sex: female  Age 22 y.o. Subjective:  Chief Complaint   Patient presents with    Pharyngitis     Started Tuesday.  Urinary Tract Infection     when urinating burning. HPI:   Catracho Edge , 22 y.o. female presents to the clinic for evaluation of UTI symptoms x 2 days. Reports associated frequency, urgency, and suprapubic pressure. The patient has not taken any treatment for symptoms. The patient reports unchanged symptoms over time. Denies gross hematuria and denies associated flank pain. Denies vaginal discharge, vaginal bleeding, possibility of pregnancy, or lethargy. The patient also reports sore throat x 2 days. The patient also reports sinus drainage. The patient has taken throat lozenges for symptoms. The patient reports unchanged symptoms overtime. The patient denies ill exposure. The patient reports hx of COVID-19 (2020) and reports having the vaccines. The patient also denies headache, fever, chest pain, abdominal pain, shortness of breath, and nausea / vomiting / diarrhea. Patient's last menstrual period was 2021. ROS:   Unless otherwise stated in this report the patient's positive and negative responses for review of systems for constitutional, eyes, ENT, cardiovascular, respiratory, gastrointestinal, neurological, , musculoskeletal, and integument systems and related systems to the presenting problem are either stated in the history of present illness or were not pertinent or were negative for the symptoms and/or complaints related to the presenting medical problem. Positives and pertinent negatives as per HPI. All others reviewed and are negative. PMH:   History reviewed. No pertinent past medical history. History reviewed. No pertinent surgical history. History reviewed. No pertinent family history.     Medications:     Current Outpatient Medications:     cefdinir (OMNICEF) 300 MG capsule, Take 1 capsule by mouth 2 times daily for 7 days, Disp: 14 capsule, Rfl: 0    norgestimate-ethinyl estradiol (ORTHO-CYCLEN) 0.25-35 MG-MCG per tablet, take 1 tablet by mouth once daily, Disp: , Rfl:     FLUoxetine (PROZAC) 40 MG capsule, take 1 capsule by mouth once daily, Disp: 30 capsule, Rfl: 2    clindamycin (CLEOCIN) 2 % vaginal cream, insert 1 applicatorful vaginally daily, Disp: , Rfl:     clindamycin (CLEOCIN T) 1 % lotion, as needed , Disp: , Rfl:     EPINEPHrine (EPIPEN 2-ZAN) 0.3 MG/0.3ML SOAJ injection, Inject 0.3 mLs into the muscle once for 1 dose Use as directed for allergic reaction, Disp: 0.3 mL, Rfl: 3    Adapalene 0.3 % GEL, apply to affected area at bedtime, Disp: , Rfl:     Allergies: Allergies   Allergen Reactions    Peanut-Containing Drug Products        Social History:     Social History     Tobacco Use    Smoking status: Never Smoker    Smokeless tobacco: Never Used   Substance Use Topics    Alcohol use: Never    Drug use: Never       Patient lives at home. Physical Exam:     Vitals:    12/17/21 0819   BP: 116/64   Pulse: 81   Resp: 16   Temp: 98.4 °F (36.9 °C)   SpO2: 97%   Weight: 170 lb (77.1 kg)   Height: 5' 11\" (1.803 m)       Physical Exam (PE)   Constitutional: Alert, development consistent with age. HENT:      Head: Normocephalic. Right Ear: External ear normal.      Left Ear: External ear normal.      Nose: Rhinitis. Mouth/Throat:     Mouth: Mucous membranes are moist.      Pharynx: Oropharynx is clear with mild erythema and cobbling. Eyes: Pupils: Pupils are equal, round, and reactive to light. Neck: Normal ROM. Supple. Cardiovascular: Heart RRR without pathologic murmurs or gallops. Pulmonary: Respiratory effort normal.  Normal breath sounds. Abdomen: Soft, nondistended, with mild suprapubic tenderness. No rebound, rigidity, or guarding. BS+ X4. No organomegaly. Back: Negative CVA tenderness. Skin:  Normal turgor.   Warm, dry, without visible rash, unless noted elsewhere. Neurological:  Alert and oriented. Motor functions intact. Responds to verbal commands. Psychiatric: Mood and Affect: Mood normal. Behavior: Behavior normal    Testing:   (All laboratory and radiology results have been personally reviewed by myself)  Labs:  Results for orders placed or performed in visit on 12/17/21   POCT Urinalysis no Micro   Result Value Ref Range    Color, UA yellow     Clarity, UA cloudy     Glucose, UA POC negative     Bilirubin, UA negative     Ketones, UA negative     Spec Grav, UA 1.030     Blood, UA POC trace     pH, UA 7.0     Protein, UA POC trace     Urobilinogen, UA 0.2     Leukocytes, UA trace     Nitrite, UA negative     Appearance, Fluid Cloudy (A) Clear, Slightly Cloudy   POCT rapid strep A   Result Value Ref Range    Strep A Ag None Detected None Detected       Imaging: All Radiology results interpreted by Radiologist unless otherwise noted. No orders to display       Assessment / Plan:   The patient's vitals, allergies, medications, and past medical history have been reviewed. Uzma Beasley was seen today for pharyngitis and urinary tract infection. Diagnoses and all orders for this visit:    Urinary tract infection with hematuria, site unspecified  -     cefdinir (OMNICEF) 300 MG capsule; Take 1 capsule by mouth 2 times daily for 7 days    Acute rhinitis    Sore throat  -     POCT rapid strep A    Dysuria  -     POCT Urinalysis no Micro  -     Culture, Urine; Future        - Disposition: Home    - Educational material printed for patient's review and were included in patient instructions. After Visit Summary and given to patient at the end of visit. - Patient declined COVID-19 test today. - Urine C&S pending, will call with results once available. Discussed symptomatic treatments with patient today. Increase fluids and rest. F/u PCP in 3-5 days if symptoms persist. ED sooner if symptoms worsen or change.  ED immediately with the development of fever, shaking chills, body aches, flank pain, vomiting, CP, or SOB. Pt is in agreement with this care plan. All questions answered. SIGNATURE: JOVANNA Rehman-CNP    *NOTE: This report was transcribed using voice recognition software. Every effort was made to ensure accuracy; however, inadvertent computerized transcription errors may be present.

## 2021-12-20 LAB
ORGANISM: ABNORMAL
ORGANISM: ABNORMAL
URINE CULTURE, ROUTINE: ABNORMAL
URINE CULTURE, ROUTINE: ABNORMAL

## 2021-12-23 ENCOUNTER — OFFICE VISIT (OUTPATIENT)
Dept: PRIMARY CARE CLINIC | Age: 21
End: 2021-12-23
Payer: COMMERCIAL

## 2021-12-23 VITALS
TEMPERATURE: 97.8 F | SYSTOLIC BLOOD PRESSURE: 120 MMHG | DIASTOLIC BLOOD PRESSURE: 78 MMHG | HEART RATE: 82 BPM | OXYGEN SATURATION: 97 %

## 2021-12-23 DIAGNOSIS — Z23 NEED FOR HPV VACCINATION: ICD-10-CM

## 2021-12-23 DIAGNOSIS — F41.9 ANXIETY: Primary | ICD-10-CM

## 2021-12-23 DIAGNOSIS — N92.6 IRREGULAR MENSTRUAL BLEEDING: ICD-10-CM

## 2021-12-23 PROCEDURE — 90651 9VHPV VACCINE 2/3 DOSE IM: CPT | Performed by: FAMILY MEDICINE

## 2021-12-23 PROCEDURE — 90471 IMMUNIZATION ADMIN: CPT | Performed by: FAMILY MEDICINE

## 2021-12-23 PROCEDURE — 99214 OFFICE O/P EST MOD 30 MIN: CPT | Performed by: FAMILY MEDICINE

## 2021-12-23 ASSESSMENT — ENCOUNTER SYMPTOMS
GASTROINTESTINAL NEGATIVE: 1
EYES NEGATIVE: 1
RESPIRATORY NEGATIVE: 1
ALLERGIC/IMMUNOLOGIC NEGATIVE: 1

## 2021-12-23 NOTE — PROGRESS NOTES
21     Adam Middleton    : 2000 Sex: female   Age: 22 y.o. Chief Complaint   Patient presents with    Anxiety    Other     would like hormones checked       Prior to Admission medications    Medication Sig Start Date End Date Taking? Authorizing Provider   norgestimate-ethinyl estradiol (ORTHO-CYCLEN) 0.25-35 MG-MCG per tablet take 1 tablet by mouth once daily 21  Yes Historical Provider, MD   FLUoxetine (PROZAC) 40 MG capsule take 1 capsule by mouth once daily 21  Yes Juan Caceres,    clindamycin (CLEOCIN) 2 % vaginal cream insert 1 applicatorful vaginally daily 11/3/21  Yes Historical Provider, MD   clindamycin (CLEOCIN T) 1 % lotion as needed  20  Yes Historical Provider, MD   EPINEPHrine (EPIPEN 2-ZAN) 0.3 MG/0.3ML SOAJ injection Inject 0.3 mLs into the muscle once for 1 dose Use as directed for allergic reaction 20 Yes Nadiya Isbell,    Adapalene 0.3 % GEL apply to affected area at bedtime 20  Yes Historical Provider, MD          HPI: Patient evaluated today recent problems of irregular menstrual bleeding and following with Dr. Luli Zazueta and birth control adjusted. Medically anxiety depression better control with the Prozac and will maintain. Physically she looks well and is doing well. Continue encourage diet exercise. Review of Systems   Constitutional: Negative. HENT: Negative. Eyes: Negative. Respiratory: Negative. Gastrointestinal: Negative. Endocrine: Negative. Genitourinary: Negative. Musculoskeletal: Negative. Skin: Negative. Allergic/Immunologic: Negative. Neurological: Negative. Hematological: Negative. Psychiatric/Behavioral: Negative. Today systems review stable meds as prescribed.         Current Outpatient Medications:     norgestimate-ethinyl estradiol (ORTHO-CYCLEN) 0.25-35 MG-MCG per tablet, take 1 tablet by mouth once daily, Disp: , Rfl:     FLUoxetine (PROZAC) 40 MG capsule, take 1 capsule by mouth once daily, Disp: 30 capsule, Rfl: 2    clindamycin (CLEOCIN) 2 % vaginal cream, insert 1 applicatorful vaginally daily, Disp: , Rfl:     clindamycin (CLEOCIN T) 1 % lotion, as needed , Disp: , Rfl:     EPINEPHrine (EPIPEN 2-ZAN) 0.3 MG/0.3ML SOAJ injection, Inject 0.3 mLs into the muscle once for 1 dose Use as directed for allergic reaction, Disp: 0.3 mL, Rfl: 3    Adapalene 0.3 % GEL, apply to affected area at bedtime, Disp: , Rfl:     Allergies   Allergen Reactions    Peanut-Containing Drug Products        Social History     Tobacco Use    Smoking status: Never Smoker    Smokeless tobacco: Never Used   Substance Use Topics    Alcohol use: Never    Drug use: Never      No past surgical history on file. No family history on file. No past medical history on file. Vitals:    12/23/21 0950   BP: 120/78   Pulse: 82   Temp: 97.8 °F (36.6 °C)   SpO2: 97%     BP Readings from Last 3 Encounters:   12/23/21 120/78   12/17/21 116/64   11/24/21 114/64        Physical Exam  Vitals and nursing note reviewed. Constitutional:       Appearance: She is well-developed. HENT:      Head: Normocephalic. Right Ear: External ear normal.      Left Ear: External ear normal.      Nose: Nose normal.   Eyes:      Conjunctiva/sclera: Conjunctivae normal.      Pupils: Pupils are equal, round, and reactive to light. Cardiovascular:      Rate and Rhythm: Normal rate. Pulmonary:      Breath sounds: Normal breath sounds. Abdominal:      General: Bowel sounds are normal.      Palpations: Abdomen is soft. Musculoskeletal:         General: Normal range of motion. Cervical back: Normal range of motion and neck supple. Skin:     General: Skin is warm and dry. Neurological:      Mental Status: She is alert and oriented to person, place, and time. Psychiatric:         Behavior: Behavior normal.     Today's vitals physical examination stable. We will maintain present meds and care. Follow-up visit 3 months and sooner if problems. Plan Per Assessment:  Radha Childers was seen today for anxiety and other. Diagnoses and all orders for this visit:    Anxiety    Irregular menstrual bleeding            Return in about 3 months (around 3/23/2022). Tamela Arce DO    Note was generated with the assistance of voice recognition software. Document was reviewed however may contain grammatical errors.

## 2021-12-23 NOTE — PROGRESS NOTES
21     Divina Potter    : 2000 Sex: female   Age: 22 y.o. Chief Complaint   Patient presents with    Anxiety    Other     would like hormones checked       Prior to Admission medications    Medication Sig Start Date End Date Taking? Authorizing Provider   norgestimate-ethinyl estradiol (ORTHO-CYCLEN) 0.25-35 MG-MCG per tablet take 1 tablet by mouth once daily 21  Yes Historical Provider, MD   FLUoxetine (PROZAC) 40 MG capsule take 1 capsule by mouth once daily 21  Yes Nicole Caceres DO   clindamycin (CLEOCIN) 2 % vaginal cream insert 1 applicatorful vaginally daily 11/3/21  Yes Historical Provider, MD   clindamycin (CLEOCIN T) 1 % lotion as needed  20  Yes Historical Provider, MD   EPINEPHrine (EPIPEN 2-ZAN) 0.3 MG/0.3ML SOAJ injection Inject 0.3 mLs into the muscle once for 1 dose Use as directed for allergic reaction 20 Yes Nadiya Isbell DO   Adapalene 0.3 % GEL apply to affected area at bedtime 20  Yes Historical Provider, MD          HPI:           Review of Systems   Constitutional: Negative. HENT: Negative. Eyes: Negative. Respiratory: Negative. Gastrointestinal: Negative. Endocrine: Negative. Genitourinary: Negative. Musculoskeletal: Negative. Skin: Negative. Allergic/Immunologic: Negative. Neurological: Negative. Hematological: Negative. Psychiatric/Behavioral: Negative.                Current Outpatient Medications:     norgestimate-ethinyl estradiol (ORTHO-CYCLEN) 0.25-35 MG-MCG per tablet, take 1 tablet by mouth once daily, Disp: , Rfl:     FLUoxetine (PROZAC) 40 MG capsule, take 1 capsule by mouth once daily, Disp: 30 capsule, Rfl: 2    clindamycin (CLEOCIN) 2 % vaginal cream, insert 1 applicatorful vaginally daily, Disp: , Rfl:     clindamycin (CLEOCIN T) 1 % lotion, as needed , Disp: , Rfl:     EPINEPHrine (EPIPEN 2-ZAN) 0.3 MG/0.3ML SOAJ injection, Inject 0.3 mLs into the muscle once for 1 dose Use as directed for allergic reaction, Disp: 0.3 mL, Rfl: 3    Adapalene 0.3 % GEL, apply to affected area at bedtime, Disp: , Rfl:     Allergies   Allergen Reactions    Peanut-Containing Drug Products        Social History     Tobacco Use    Smoking status: Never Smoker    Smokeless tobacco: Never Used   Substance Use Topics    Alcohol use: Never    Drug use: Never      No past surgical history on file. No family history on file. No past medical history on file. Vitals:    12/23/21 0950   BP: 120/78   Pulse: 82   Temp: 97.8 °F (36.6 °C)   SpO2: 97%     BP Readings from Last 3 Encounters:   12/23/21 120/78   12/17/21 116/64   11/24/21 114/64        Physical Exam  Vitals and nursing note reviewed. Constitutional:       Appearance: She is well-developed. HENT:      Head: Normocephalic. Right Ear: External ear normal.      Left Ear: External ear normal.      Nose: Nose normal.   Eyes:      Conjunctiva/sclera: Conjunctivae normal.      Pupils: Pupils are equal, round, and reactive to light. Cardiovascular:      Rate and Rhythm: Normal rate. Pulmonary:      Breath sounds: Normal breath sounds. Abdominal:      General: Bowel sounds are normal.      Palpations: Abdomen is soft. Musculoskeletal:         General: Normal range of motion. Cervical back: Normal range of motion and neck supple. Skin:     General: Skin is warm and dry. Neurological:      Mental Status: She is alert and oriented to person, place, and time.    Psychiatric:         Behavior: Behavior normal.              Lab Results   Component Value Date    TSH 0.661 11/08/2021    TSH 0.520 08/05/2021    Y2EFKTM 7.3 11/08/2021    Y9TQJXJ 8.2 08/05/2021    T4FREE 1.67 08/09/2019     Lab Results   Component Value Date    CHOL 157 01/28/2021     Lab Results   Component Value Date    TRIG 95 01/28/2021     Lab Results   Component Value Date    HDL 55 01/28/2021     No results found for: [facility]  Lab Results   Component

## 2022-02-14 RX ORDER — FLUOXETINE HYDROCHLORIDE 40 MG/1
CAPSULE ORAL
Qty: 30 CAPSULE | Refills: 2 | Status: SHIPPED
Start: 2022-02-14 | End: 2022-05-04 | Stop reason: SDUPTHER

## 2022-03-23 ENCOUNTER — OFFICE VISIT (OUTPATIENT)
Dept: PRIMARY CARE CLINIC | Age: 22
End: 2022-03-23
Payer: COMMERCIAL

## 2022-03-23 VITALS
OXYGEN SATURATION: 97 % | HEIGHT: 71 IN | HEART RATE: 77 BPM | DIASTOLIC BLOOD PRESSURE: 76 MMHG | TEMPERATURE: 97.9 F | BODY MASS INDEX: 24.78 KG/M2 | SYSTOLIC BLOOD PRESSURE: 116 MMHG | WEIGHT: 177 LBS

## 2022-03-23 DIAGNOSIS — R53.83 OTHER FATIGUE: ICD-10-CM

## 2022-03-23 DIAGNOSIS — R10.2 PELVIC PAIN: ICD-10-CM

## 2022-03-23 DIAGNOSIS — F41.9 ANXIETY: Primary | ICD-10-CM

## 2022-03-23 DIAGNOSIS — F41.9 ANXIETY: ICD-10-CM

## 2022-03-23 LAB
ALBUMIN SERPL-MCNC: 4.1 G/DL (ref 3.5–5.2)
ALP BLD-CCNC: 53 U/L (ref 35–104)
ALT SERPL-CCNC: 17 U/L (ref 0–32)
AMYLASE: 65 U/L (ref 20–100)
ANION GAP SERPL CALCULATED.3IONS-SCNC: 14 MMOL/L (ref 7–16)
AST SERPL-CCNC: 26 U/L (ref 0–31)
BASOPHILS ABSOLUTE: 0.04 E9/L (ref 0–0.2)
BASOPHILS RELATIVE PERCENT: 0.6 % (ref 0–2)
BILIRUB SERPL-MCNC: 0.5 MG/DL (ref 0–1.2)
BUN BLDV-MCNC: 18 MG/DL (ref 6–20)
CALCIUM SERPL-MCNC: 9.8 MG/DL (ref 8.6–10.2)
CHLORIDE BLD-SCNC: 103 MMOL/L (ref 98–107)
CO2: 23 MMOL/L (ref 22–29)
CREAT SERPL-MCNC: 0.7 MG/DL (ref 0.5–1)
EOSINOPHILS ABSOLUTE: 0.1 E9/L (ref 0.05–0.5)
EOSINOPHILS RELATIVE PERCENT: 1.4 % (ref 0–6)
GFR AFRICAN AMERICAN: >60
GFR NON-AFRICAN AMERICAN: >60 ML/MIN/1.73
GLUCOSE BLD-MCNC: 93 MG/DL (ref 74–99)
HCT VFR BLD CALC: 40.9 % (ref 34–48)
HEMOGLOBIN: 13.4 G/DL (ref 11.5–15.5)
IMMATURE GRANULOCYTES #: 0.01 E9/L
IMMATURE GRANULOCYTES %: 0.1 % (ref 0–5)
LIPASE: 23 U/L (ref 13–60)
LYMPHOCYTES ABSOLUTE: 2.53 E9/L (ref 1.5–4)
LYMPHOCYTES RELATIVE PERCENT: 36 % (ref 20–42)
MCH RBC QN AUTO: 28.9 PG (ref 26–35)
MCHC RBC AUTO-ENTMCNC: 32.8 % (ref 32–34.5)
MCV RBC AUTO: 88.3 FL (ref 80–99.9)
MONOCYTES ABSOLUTE: 0.48 E9/L (ref 0.1–0.95)
MONOCYTES RELATIVE PERCENT: 6.8 % (ref 2–12)
NEUTROPHILS ABSOLUTE: 3.87 E9/L (ref 1.8–7.3)
NEUTROPHILS RELATIVE PERCENT: 55.1 % (ref 43–80)
PDW BLD-RTO: 11.8 FL (ref 11.5–15)
PLATELET # BLD: 282 E9/L (ref 130–450)
PMV BLD AUTO: 11.5 FL (ref 7–12)
POTASSIUM SERPL-SCNC: 3.8 MMOL/L (ref 3.5–5)
RBC # BLD: 4.63 E12/L (ref 3.5–5.5)
SODIUM BLD-SCNC: 140 MMOL/L (ref 132–146)
T4 TOTAL: 7.1 MCG/DL (ref 4.5–11.7)
TOTAL PROTEIN: 7.1 G/DL (ref 6.4–8.3)
TSH SERPL DL<=0.05 MIU/L-ACNC: 0.9 UIU/ML (ref 0.27–4.2)
WBC # BLD: 7 E9/L (ref 4.5–11.5)

## 2022-03-23 PROCEDURE — 99214 OFFICE O/P EST MOD 30 MIN: CPT | Performed by: FAMILY MEDICINE

## 2022-03-23 ASSESSMENT — ENCOUNTER SYMPTOMS
ABDOMINAL DISTENTION: 1
EYES NEGATIVE: 1
CONSTIPATION: 1
ALLERGIC/IMMUNOLOGIC NEGATIVE: 1
RESPIRATORY NEGATIVE: 1
ABDOMINAL PAIN: 1

## 2022-03-23 NOTE — PROGRESS NOTES
3/23/22     Jadyn Harris    : 2000 Sex: female   Age: 22 y.o. Chief Complaint   Patient presents with    Anxiety    Abdominal Pain     favored on right side, last week was nauseous, pain above stomach and felt like someone was pushing on that area    Stool Color Change     darker in color, almost black    Labs Only     would like hormone levels checked       Prior to Admission medications    Medication Sig Start Date End Date Taking? Authorizing Provider   FLUoxetine (PROZAC) 40 MG capsule take 1 capsule by mouth once daily 22  Yes Viri Eric, DO   norgestimate-ethinyl estradiol (ORTHO-CYCLEN) 0.25-35 MG-MCG per tablet take 1 tablet by mouth once daily 21  Yes Historical Provider, MD   EPINEPHrine (EPIPEN 2-ZAN) 0.3 MG/0.3ML SOAJ injection Inject 0.3 mLs into the muscle once for 1 dose Use as directed for allergic reaction 20  Nadiya Isbell DO          HPI: Patient evaluated today with anxiety fatigue and some pelvic and epigastric discomfort. She had vaginal ultrasound through gynecology that was normal findings. Believe her pelvic pain is more mittelschmerz and recommended ibuprofen as needed. Epigastric discomfort is intermittent and we will follow-up additional labs. If necessary ultrasound of the gallbladder. Anxiety well controlled on Prozac and 40 daily continue. Review of Systems   Constitutional: Negative. HENT: Negative. Eyes: Negative. Respiratory: Negative. Gastrointestinal: Positive for abdominal distention, abdominal pain and constipation. Endocrine: Negative. Genitourinary: Positive for pelvic pain. Musculoskeletal: Negative. Skin: Negative. Allergic/Immunologic: Negative. Neurological: Negative. Hematological: Negative. Psychiatric/Behavioral: Negative. Systems review overall stable aside from voiced chief complaints.         Current Outpatient Medications:     FLUoxetine (PROZAC) 40 MG capsule, take 1 capsule by mouth once daily, Disp: 30 capsule, Rfl: 2    norgestimate-ethinyl estradiol (ORTHO-CYCLEN) 0.25-35 MG-MCG per tablet, take 1 tablet by mouth once daily, Disp: , Rfl:     EPINEPHrine (EPIPEN 2-ZAN) 0.3 MG/0.3ML SOAJ injection, Inject 0.3 mLs into the muscle once for 1 dose Use as directed for allergic reaction, Disp: 0.3 mL, Rfl: 3    Allergies   Allergen Reactions    Peanut-Containing Drug Products        Social History     Tobacco Use    Smoking status: Never Smoker    Smokeless tobacco: Never Used   Substance Use Topics    Alcohol use: Never    Drug use: Never      No past surgical history on file. No family history on file. No past medical history on file. Vitals:    03/23/22 1546   BP: 116/76   Pulse: 77   Temp: 97.9 °F (36.6 °C)   SpO2: 97%   Weight: 177 lb (80.3 kg)   Height: 5' 11\" (1.803 m)     BP Readings from Last 3 Encounters:   03/23/22 116/76   12/23/21 120/78   12/17/21 116/64        Physical Exam  Vitals and nursing note reviewed. Constitutional:       Appearance: She is well-developed. HENT:      Head: Normocephalic. Right Ear: External ear normal.      Left Ear: External ear normal.      Nose: Nose normal.   Eyes:      Conjunctiva/sclera: Conjunctivae normal.      Pupils: Pupils are equal, round, and reactive to light. Cardiovascular:      Rate and Rhythm: Normal rate. Pulmonary:      Breath sounds: Normal breath sounds. Abdominal:      General: Bowel sounds are normal.      Palpations: Abdomen is soft. Musculoskeletal:         General: Normal range of motion. Cervical back: Normal range of motion and neck supple. Skin:     General: Skin is warm and dry. Neurological:      Mental Status: She is alert and oriented to person, place, and time. Psychiatric:         Behavior: Behavior normal.     Today on physical exam stable findings. Abdomen soft no rebound no guarding. No significant pelvic discomfort.   Medications reviewed maintain as prescribed. Vital signs are all stable. Heart is regular lungs are clear. Reassessment with me 3 weeks and sooner if problems. Plan Per Assessment:  Kim Pelayo was seen today for anxiety, abdominal pain, stool color change and labs only. Diagnoses and all orders for this visit:    Anxiety  -     CBC with Auto Differential; Future  -     Comprehensive Metabolic Panel; Future  -     Amylase; Future  -     Lipase; Future  -     T4; Future  -     TSH; Future    Other fatigue  -     CBC with Auto Differential; Future  -     Comprehensive Metabolic Panel; Future  -     Amylase; Future  -     Lipase; Future  -     T4; Future  -     TSH; Future    Pelvic pain  -     CBC with Auto Differential; Future  -     Comprehensive Metabolic Panel; Future  -     Amylase; Future  -     Lipase; Future  -     T4; Future  -     TSH; Future            Return in about 3 weeks (around 4/13/2022). Jose Flores DO    Note was generated with the assistance of voice recognition software. Document was reviewed however may contain grammatical errors.

## 2022-04-11 ENCOUNTER — OFFICE VISIT (OUTPATIENT)
Dept: PRIMARY CARE CLINIC | Age: 22
End: 2022-04-11
Payer: COMMERCIAL

## 2022-04-11 VITALS
TEMPERATURE: 97.7 F | HEIGHT: 71 IN | HEART RATE: 87 BPM | OXYGEN SATURATION: 97 % | DIASTOLIC BLOOD PRESSURE: 80 MMHG | BODY MASS INDEX: 24.69 KG/M2 | SYSTOLIC BLOOD PRESSURE: 120 MMHG

## 2022-04-11 DIAGNOSIS — M54.42 ACUTE LEFT-SIDED LOW BACK PAIN WITH LEFT-SIDED SCIATICA: Primary | ICD-10-CM

## 2022-04-11 DIAGNOSIS — K13.79 MOUTH SORES: ICD-10-CM

## 2022-04-11 DIAGNOSIS — H65.192 ACUTE MIDDLE EAR EFFUSION, LEFT: ICD-10-CM

## 2022-04-11 PROCEDURE — 99214 OFFICE O/P EST MOD 30 MIN: CPT | Performed by: FAMILY MEDICINE

## 2022-04-11 RX ORDER — METHYLPREDNISOLONE 4 MG/1
TABLET ORAL
Qty: 21 TABLET | Refills: 0 | Status: SHIPPED
Start: 2022-04-11 | End: 2022-04-19 | Stop reason: CLARIF

## 2022-04-11 NOTE — PROGRESS NOTES
22     Radha Penn    : 2000 Sex: female   Age: 22 y.o. Chief Complaint   Patient presents with    Anxiety    Back Pain     started a little over a week ago having trouble bending over and doing normal things without pain, lower back into sciatic region into left side    Mouth Lesions     on left side of mouth       Prior to Admission medications    Medication Sig Start Date End Date Taking? Authorizing Provider   methylPREDNISolone (MEDROL DOSEPACK) 4 MG tablet Take by mouth as directed. 22  Yes Tee Caceres,    FLUoxetine (PROZAC) 40 MG capsule take 1 capsule by mouth once daily 22  Yes Ho Harry,    norgestimate-ethinyl estradiol (ORTHO-CYCLEN) 0.25-35 MG-MCG per tablet take 1 tablet by mouth once daily 21  Yes Historical Provider, MD   EPINEPHrine (EPIPEN 2-ZAN) 0.3 MG/0.3ML SOAJ injection Inject 0.3 mLs into the muscle once for 1 dose Use as directed for allergic reaction 20  Nadiya Isbell DO          HPI: Patient evaluated today problems with low back discomfort sciatica. Recommended some home stretches and we are going to try a Medrol pack and then if persistent will follow-up. Sores in the mouth have been present and she was prescribed Valtrex and I encouraged her to go ahead and try the medication twice a day 500 mg and if persistent then would require further evaluation work-up. Concerns for fluid in the left ear mild serous effusion and pressure. Recommending the Medrol pack as noted and Sudafed as needed. Reassess next week. Review of Systems   Constitutional: Negative. HENT: Negative. Eyes: Negative. Respiratory: Negative. Gastrointestinal: Negative. Endocrine: Negative. Genitourinary: Negative. Musculoskeletal: Negative. Skin: Negative. Allergic/Immunologic: Negative. Neurological: Negative. Hematological: Negative. Psychiatric/Behavioral: Negative.        Systems review overall stable aside from the voiced chief complaints. Current Outpatient Medications:     methylPREDNISolone (MEDROL DOSEPACK) 4 MG tablet, Take by mouth as directed., Disp: 21 tablet, Rfl: 0    FLUoxetine (PROZAC) 40 MG capsule, take 1 capsule by mouth once daily, Disp: 30 capsule, Rfl: 2    norgestimate-ethinyl estradiol (ORTHO-CYCLEN) 0.25-35 MG-MCG per tablet, take 1 tablet by mouth once daily, Disp: , Rfl:     EPINEPHrine (EPIPEN 2-ZAN) 0.3 MG/0.3ML SOAJ injection, Inject 0.3 mLs into the muscle once for 1 dose Use as directed for allergic reaction, Disp: 0.3 mL, Rfl: 3    Allergies   Allergen Reactions    Peanut-Containing Drug Products        Social History     Tobacco Use    Smoking status: Never Smoker    Smokeless tobacco: Never Used   Substance Use Topics    Alcohol use: Never    Drug use: Never      No past surgical history on file. No family history on file. No past medical history on file. Vitals:    04/11/22 1557   BP: 120/80   Pulse: 87   Temp: 97.7 °F (36.5 °C)   SpO2: 97%   Height: 5' 11\" (1.803 m)     BP Readings from Last 3 Encounters:   04/11/22 120/80   03/23/22 116/76   12/23/21 120/78        Physical Exam  Vitals and nursing note reviewed. Constitutional:       Appearance: She is well-developed. HENT:      Head: Normocephalic. Right Ear: External ear normal.      Left Ear: External ear normal.      Nose: Nose normal.   Eyes:      Conjunctiva/sclera: Conjunctivae normal.      Pupils: Pupils are equal, round, and reactive to light. Cardiovascular:      Rate and Rhythm: Normal rate. Pulmonary:      Breath sounds: Normal breath sounds. Abdominal:      General: Bowel sounds are normal.      Palpations: Abdomen is soft. Musculoskeletal:         General: Normal range of motion. Cervical back: Normal range of motion and neck supple. Skin:     General: Skin is warm and dry.    Neurological:      Mental Status: She is alert and oriented to person, place, and time.   Psychiatric:         Behavior: Behavior normal.     Today's vitals physical examination stable. Medications as prescribed. Follow-up visit with me next week and then sooner if problems. Plan Per Assessment:  Vianney Mix was seen today for anxiety, back pain and mouth lesions. Diagnoses and all orders for this visit:    Acute left-sided low back pain with left-sided sciatica    Mouth sores    Acute middle ear effusion, left    Other orders  -     methylPREDNISolone (MEDROL DOSEPACK) 4 MG tablet; Take by mouth as directed. Return in about 1 week (around 4/18/2022). Selina Corpus, DO    Note was generated with the assistance of voice recognition software. Document was reviewed however may contain grammatical errors.

## 2022-04-15 ENCOUNTER — HOSPITAL ENCOUNTER (EMERGENCY)
Age: 22
Discharge: HOME | End: 2022-04-15
Payer: COMMERCIAL

## 2022-04-15 ENCOUNTER — APPOINTMENT (OUTPATIENT)
Dept: CT IMAGING | Age: 22
End: 2022-04-15
Payer: COMMERCIAL

## 2022-04-15 VITALS
HEART RATE: 86 BPM | SYSTOLIC BLOOD PRESSURE: 131 MMHG | WEIGHT: 172 LBS | HEIGHT: 71 IN | OXYGEN SATURATION: 98 % | DIASTOLIC BLOOD PRESSURE: 81 MMHG | BODY MASS INDEX: 24.08 KG/M2 | RESPIRATION RATE: 16 BRPM | TEMPERATURE: 98.2 F

## 2022-04-15 DIAGNOSIS — M54.32 SCIATICA OF LEFT SIDE: ICD-10-CM

## 2022-04-15 DIAGNOSIS — M51.26 HERNIATED LUMBAR INTERVERTEBRAL DISC: Primary | ICD-10-CM

## 2022-04-15 LAB
BILIRUBIN URINE: NEGATIVE
BLOOD, URINE: NEGATIVE
CLARITY: CLEAR
COLOR: YELLOW
GLUCOSE URINE: NEGATIVE MG/DL
HCG, URINE, POC: NEGATIVE
KETONES, URINE: NEGATIVE MG/DL
LEUKOCYTE ESTERASE, URINE: NEGATIVE
Lab: NORMAL
NEGATIVE QC PASS/FAIL: NORMAL
NITRITE, URINE: NEGATIVE
PH UA: 6 (ref 5–9)
POSITIVE QC PASS/FAIL: NORMAL
PROTEIN UA: NEGATIVE MG/DL
SPECIFIC GRAVITY UA: 1.02 (ref 1–1.03)
UROBILINOGEN, URINE: 0.2 E.U./DL

## 2022-04-15 PROCEDURE — 96374 THER/PROPH/DIAG INJ IV PUSH: CPT

## 2022-04-15 PROCEDURE — 72128 CT CHEST SPINE W/O DYE: CPT

## 2022-04-15 PROCEDURE — 87088 URINE BACTERIA CULTURE: CPT

## 2022-04-15 PROCEDURE — 96375 TX/PRO/DX INJ NEW DRUG ADDON: CPT

## 2022-04-15 PROCEDURE — 81003 URINALYSIS AUTO W/O SCOPE: CPT

## 2022-04-15 PROCEDURE — 6360000002 HC RX W HCPCS: Performed by: PHYSICIAN ASSISTANT

## 2022-04-15 PROCEDURE — 72131 CT LUMBAR SPINE W/O DYE: CPT

## 2022-04-15 PROCEDURE — 99284 EMERGENCY DEPT VISIT MOD MDM: CPT

## 2022-04-15 PROCEDURE — 6370000000 HC RX 637 (ALT 250 FOR IP): Performed by: PHYSICIAN ASSISTANT

## 2022-04-15 RX ORDER — SODIUM CHLORIDE 0.9 % (FLUSH) 0.9 %
10 SYRINGE (ML) INJECTION PRN
Status: DISCONTINUED | OUTPATIENT
Start: 2022-04-15 | End: 2022-04-15 | Stop reason: HOSPADM

## 2022-04-15 RX ORDER — TIZANIDINE 4 MG/1
4 TABLET ORAL EVERY 6 HOURS PRN
Qty: 20 TABLET | Refills: 0 | Status: SHIPPED | OUTPATIENT
Start: 2022-04-15 | End: 2022-04-20

## 2022-04-15 RX ORDER — HYDROCODONE BITARTRATE AND ACETAMINOPHEN 5; 325 MG/1; MG/1
1 TABLET ORAL EVERY 6 HOURS PRN
Qty: 20 TABLET | Refills: 0 | Status: SHIPPED | OUTPATIENT
Start: 2022-04-15 | End: 2022-04-19

## 2022-04-15 RX ORDER — DIAZEPAM 5 MG/1
10 TABLET ORAL EVERY 8 HOURS PRN
Qty: 15 TABLET | Refills: 0 | Status: SHIPPED | OUTPATIENT
Start: 2022-04-15 | End: 2022-04-18

## 2022-04-15 RX ORDER — KETOROLAC TROMETHAMINE 30 MG/ML
15 INJECTION, SOLUTION INTRAMUSCULAR; INTRAVENOUS ONCE
Status: COMPLETED | OUTPATIENT
Start: 2022-04-15 | End: 2022-04-15

## 2022-04-15 RX ORDER — DIAZEPAM 5 MG/1
5 TABLET ORAL ONCE
Status: COMPLETED | OUTPATIENT
Start: 2022-04-15 | End: 2022-04-15

## 2022-04-15 RX ORDER — DEXAMETHASONE SODIUM PHOSPHATE 10 MG/ML
6 INJECTION INTRAMUSCULAR; INTRAVENOUS ONCE
Status: COMPLETED | OUTPATIENT
Start: 2022-04-15 | End: 2022-04-15

## 2022-04-15 RX ADMIN — DIAZEPAM 5 MG: 5 TABLET ORAL at 09:00

## 2022-04-15 RX ADMIN — DEXAMETHASONE SODIUM PHOSPHATE 6 MG: 10 INJECTION INTRAMUSCULAR; INTRAVENOUS at 09:01

## 2022-04-15 RX ADMIN — KETOROLAC TROMETHAMINE 15 MG: 30 INJECTION, SOLUTION INTRAMUSCULAR at 09:00

## 2022-04-15 ASSESSMENT — PAIN DESCRIPTION - ONSET: ONSET: ON-GOING

## 2022-04-15 ASSESSMENT — PAIN DESCRIPTION - PAIN TYPE
TYPE: ACUTE PAIN
TYPE: ACUTE PAIN;CHRONIC PAIN

## 2022-04-15 ASSESSMENT — PAIN DESCRIPTION - FREQUENCY
FREQUENCY: CONTINUOUS
FREQUENCY: CONTINUOUS

## 2022-04-15 ASSESSMENT — PAIN DESCRIPTION - LOCATION: LOCATION: BACK

## 2022-04-15 ASSESSMENT — PAIN SCALES - GENERAL
PAINLEVEL_OUTOF10: 4
PAINLEVEL_OUTOF10: 10
PAINLEVEL_OUTOF10: 9

## 2022-04-15 ASSESSMENT — PAIN - FUNCTIONAL ASSESSMENT
PAIN_FUNCTIONAL_ASSESSMENT: 0-10
PAIN_FUNCTIONAL_ASSESSMENT: 0-10

## 2022-04-15 NOTE — ED PROVIDER NOTES
[address] [place] [address]  Department of Emergency Medicine   ED  Encounter Note  Admit Date/RoomTime: 4/15/2022  8:16 AM  ED Room:     NAME: Prasad Osullivan  : 2000  MRN: [id number]     Chief Complaint:  Back Pain (back injury 2 weeks ago, moved suddenly, taking steroids, woke up today with worse pain ) and Numbness (both feet numb)    History of Present Illness       Erica Carroll is a 22 y.o. old female with a prior history of chronic back pain, presents to the emergency department, for non-traumatic acute, sharp left greater than right, midline lower lumbar spine pain with radiation, to both knees, for 1 hour(s) prior to arrival.  There has been no recent injury as it relates to today's visit althouh she thinks she hurt her back 2 weeks ago. .  She states that while driving to work her pain began to intensify and as she was walking into the building report to work she is unable to walk any further because of increased pain. She has had chiropractic manipulation many months ago but has not had any since. She denies bowel bladder incontinence. Denies any fever chills or abdominal pain. She finished her menstrual cycle 1 week ago. Her pain is made worse with any type of movement. She is currently lying on her left side which is difficult to perform examination. ROS   Pertinent positives and negatives are stated within HPI, all other systems reviewed and are negative. Past Medical History:  has no past medical history on file. Surgical History:  has no past surgical history on file. Social History:  reports that she has never smoked. She has never used smokeless tobacco. She reports that she does not drink alcohol and does not use drugs. Family History: family history is not on file.      Allergies: Peanut-containing drug products    Physical Exam   Oxygen Saturation Interpretation: Normal.        ED Triage Vitals [04/15/22 0824]   BP Temp Temp Source Pulse Resp SpO2 Height Weight   131/81 98.2 °F (36.8 °C) Oral 86 16 98 % 5' 11\" (1.803 m) 172 lb (78 kg)       Examination limited based on patient's position of comfort on the bed. Constitutional:  Alert, development consistent with age. HEENT:  NC/NT. Airway patent. Neck:  Normal ROM. Supple. Respiratory:  Clear to auscultation and breath sounds equal.  CV:  Regular rate and rhythm, normal heart sounds, without pathological murmurs, ectopy, gallops, or rubs. GI:  Abdomen Soft, nontender, good bowel sounds. No firm or pulsatile mass. Back: lower lumbar spine midline and paraspinal.             Tenderness: Mild. Swelling: no.              Range of Motion: not able to be tested. CVA Tenderness: No CVA tenderness. Straight leg raising:  Not abler to be tested. Skin:  no wounds or erythema. Distal Function:              Motor deficit: none. Sensory deficit: none. Pulse deficit: none. Calf Tenderness:  No Bilateral.               Edema:  none Both lower extremity(s). Deep Tendon Reflexes (DTR's): Bilateral Knee/Patellar reflex (L2-L4):  2 - normal  Gait:  wide based. Integument:  Normal turgor. Warm, dry, without visible rash. Lymphatics: No lymphangitis or adenopathy noted. Neurological:  Oriented. Motor functions intact.     Lab / Imaging Results   (All laboratory and radiology results have been personally reviewed by myself)  Labs:  Results for orders placed or performed during the hospital encounter of 04/15/22   Urinalysis   Result Value Ref Range    Color, UA Yellow Straw/Yellow    Clarity, UA Clear Clear    Glucose, Ur Negative Negative mg/dL    Bilirubin Urine Negative Negative    Ketones, Urine Negative Negative mg/dL    Specific Gravity, UA 1.020 1.005 - 1.030    Blood, Urine Negative Negative    pH, UA 6.0 5.0 - 9.0    Protein, UA Negative Negative mg/dL    Urobilinogen, Urine 0.2 <2.0 E.U./dL Nitrite, Urine Negative Negative    Leukocyte Esterase, Urine Negative Negative   POC Pregnancy Urine Qual   Result Value Ref Range    HCG, Urine, POC Negative Negative    Lot Number [id number]     Positive QC Pass/Fail Pass     Negative QC Pass/Fail Pass        Imaging: All Radiology results interpreted by Radiologist unless otherwise noted. CT LUMBAR SPINE WO CONTRAST   Final Result   1. No acute fracture or subluxation. Small central disc bulge/herniation at   L5-S1, no canal stenosis. 2.  Slight bilateral renal caliectasis and dilatation of the renal pelvis. Mid ureters are not distended. Correlate with renal function test.         CT THORACIC SPINE WO CONTRAST   Final Result   Unremarkable CT of the thoracic spine. CT Lumbar Spine  DEGENERATIVE CHANGES: No significant degenerative changes of the lumbar   spine.  Small central to left paracentral disc bulge at L5-S1 without   significant mass effect.  No canal stenosis. ED Course / Medical Decision Making     Medications   sodium chloride flush 0.9 % injection 10 mL (has no administration in time range)   ketorolac (TORADOL) injection 15 mg (15 mg IntraVENous Given 4/15/22 0900)   dexamethasone (DECADRON) injection 6 mg (6 mg IntraVENous Given 4/15/22 0901)   diazePAM (VALIUM) tablet 5 mg (5 mg Oral Given 4/15/22 0900)        Re-examination:  4/15/22       Time: 1045   Patients condition is improving. Uncontrolled. Patient able to stand with some discomfort. Consult(s):   None    Procedure(s):   none    Medical Decision Making:    Imaging was obtained based on moderate suspicion for herniated disc as per history/physical findings. .At this time the patient is without objective evidence of an acute process requiring inpatient management. Patient received medications in ED with some improvement. She does have a few days left of a Medrol Dosepak which she was encouraged to take.   She will be referred to spine for outpatient follow-up and evaluation of the CT scan findings suggestive of a small disc herniation without canal stenosis or nerve impingement. Plan of Care/Counseling:  Gabby [place] reviewed today's visit with the patient in addition to providing specific details for the plan of care and counseling regarding the diagnosis and prognosis. Questions are answered at this time and are agreeable with the plan. Assessment      1. Herniated Left L5-S1 intervertebral disc    2. Sciatica of left side      Plan   Discharged home. Patient condition is good    New Medications     New Prescriptions    DIAZEPAM (VALIUM) 5 MG TABLET    Take 2 tablets by mouth every 8 hours as needed (for acute muscle spasms) for up to 3 days. HYDROCODONE-ACETAMINOPHEN (NORCO) 5-325 MG PER TABLET    Take 1 tablet by mouth every 6 hours as needed for Pain for up to 5 days. TIZANIDINE (ZANAFLEX) 4 MG TABLET    Take 1 tablet by mouth every 6 hours as needed (for ongoing muscle spasm control. Do not use while taking Valium) For spasm of muscles     Electronically signed by NATHANIEL [place]   DD: 4/15/22  **This report was transcribed using voice recognition software. Every effort was made to ensure accuracy; however, inadvertent computerized transcription errors may be present.   END OF ED PROVIDER NOTE       Gabby Maya  04/15/22 1047

## 2022-04-17 LAB — URINE CULTURE, ROUTINE: NORMAL

## 2022-04-19 ENCOUNTER — OFFICE VISIT (OUTPATIENT)
Dept: PRIMARY CARE CLINIC | Age: 22
End: 2022-04-19
Payer: COMMERCIAL

## 2022-04-19 VITALS
DIASTOLIC BLOOD PRESSURE: 82 MMHG | HEART RATE: 91 BPM | OXYGEN SATURATION: 98 % | SYSTOLIC BLOOD PRESSURE: 120 MMHG | TEMPERATURE: 98.7 F

## 2022-04-19 DIAGNOSIS — F41.9 ANXIETY: ICD-10-CM

## 2022-04-19 DIAGNOSIS — M51.36 DISC DEGENERATION, LUMBAR: Primary | ICD-10-CM

## 2022-04-19 PROBLEM — M51.369 DISC DEGENERATION, LUMBAR: Status: ACTIVE | Noted: 2022-04-19

## 2022-04-19 PROCEDURE — 99214 OFFICE O/P EST MOD 30 MIN: CPT | Performed by: FAMILY MEDICINE

## 2022-04-19 RX ORDER — IBUPROFEN 800 MG/1
800 TABLET ORAL EVERY 12 HOURS PRN
Qty: 50 TABLET | Refills: 0 | Status: SHIPPED | OUTPATIENT
Start: 2022-04-19

## 2022-04-19 ASSESSMENT — ENCOUNTER SYMPTOMS
RESPIRATORY NEGATIVE: 1
ALLERGIC/IMMUNOLOGIC NEGATIVE: 1
GASTROINTESTINAL NEGATIVE: 1
BACK PAIN: 1
EYES NEGATIVE: 1

## 2022-04-19 NOTE — PROGRESS NOTES
22     [facility]    : 2000 Sex: female   Age: 25 y.o. Chief Complaint   Patient presents with    Back Pain     recheck, had CT scan showed herniated disc was given pain pills and muscle relaxers but has not started those yet    Other     was taking valium as well     Numbness       Prior to Admission medications    Medication Sig Start Date End Date Taking? Authorizing Provider   tiZANidine (ZANAFLEX) 4 MG tablet Take 1 tablet by mouth every 6 hours as needed (for ongoing muscle spasm control. Do not use while taking Valium) For spasm of muscles 4/15/22 4/20/22 Yes NATHANIEL Marie   FLUoxetine (PROZAC) 40 MG capsule take 1 capsule by mouth once daily 22  Yes Edel Fernandez DO   norgestimate-ethinyl estradiol (ORTHO-CYCLEN) 0.25-35 MG-MCG per tablet take 1 tablet by mouth once daily 21  Yes Historical Provider, MD   EPINEPHrine (EPIPEN 2-ZAN) 0.3 MG/0.3ML SOAJ injection Inject 0.3 mLs into the muscle once for 1 dose Use as directed for allergic reaction 20  Nadiya Isbell DO          HPI: Evaluated today with low back pain radicular pain into the left leg. CT scan was done through emergency room and left paracentral disc bulge at the L5-S1 level. Recommending some physical therapy and also evaluation with neurosurgery for further input. Medications reviewed as prescribed. Review of Systems   Constitutional: Negative. HENT: Negative. Eyes: Negative. Respiratory: Negative. Gastrointestinal: Negative. Endocrine: Negative. Genitourinary: Negative. Musculoskeletal: Positive for back pain and myalgias. Skin: Negative. Allergic/Immunologic: Negative. Neurological: Negative. Hematological: Negative. Psychiatric/Behavioral: Negative. Systems review is stable. Back pain with radicular discomfort as noted.       Current Outpatient Medications:     tiZANidine (ZANAFLEX) 4 MG tablet, Take 1 tablet by mouth every 6 hours as needed (for ongoing muscle spasm control. Do not use while taking Valium) For spasm of muscles, Disp: 20 tablet, Rfl: 0    FLUoxetine (PROZAC) 40 MG capsule, take 1 capsule by mouth once daily, Disp: 30 capsule, Rfl: 2    norgestimate-ethinyl estradiol (ORTHO-CYCLEN) 0.25-35 MG-MCG per tablet, take 1 tablet by mouth once daily, Disp: , Rfl:     EPINEPHrine (EPIPEN 2-ZAN) 0.3 MG/0.3ML SOAJ injection, Inject 0.3 mLs into the muscle once for 1 dose Use as directed for allergic reaction, Disp: 0.3 mL, Rfl: 3    Allergies   Allergen Reactions    Peanut-Containing Drug Products        Social History     Tobacco Use    Smoking status: Never Smoker    Smokeless tobacco: Never Used   Substance Use Topics    Alcohol use: Never    Drug use: Never      No past surgical history on file. No family history on file. No past medical history on file. Vitals:    04/19/22 1411   BP: 120/82   Pulse: 91   Temp: 98.7 °F (37.1 °C)   SpO2: 98%     BP Readings from Last 3 Encounters:   04/19/22 120/82   04/15/22 131/81   04/11/22 120/80        Physical Exam  Vitals and nursing note reviewed. Constitutional:       Appearance: She is well-developed. HENT:      Head: Normocephalic. Right Ear: External ear normal.      Left Ear: External ear normal.      Nose: Nose normal.   Eyes:      Conjunctiva/sclera: Conjunctivae normal.      Pupils: Pupils are equal, round, and reactive to light. Cardiovascular:      Rate and Rhythm: Normal rate. Pulmonary:      Breath sounds: Normal breath sounds. Abdominal:      General: Bowel sounds are normal.      Palpations: Abdomen is soft. Musculoskeletal:         General: Normal range of motion. Cervical back: Normal range of motion and neck supple. Skin:     General: Skin is warm and dry. Neurological:      Mental Status: She is alert and oriented to person, place, and time.    Psychiatric:         Behavior: Behavior normal.     Vitals physical examination overall stable. Heart is regular lungs are clear. Lower extremity reflexes equal bilaterally. Lower leg strength intact. Maintain current meds care. Follow-up visit 3 weeks and sooner if need be. Neurosurgical referral today. Physical therapy referral today. Anxiety is controlled. Plan Per Assessment:  Cheryle Yancey was seen today for back pain, other and numbness. Diagnoses and all orders for this visit:    Disc degeneration, lumbar  -     Ambulatory referral to Neurosurgery  -     External Referral To Physical Therapy    Anxiety            Return in about 3 weeks (around 5/10/2022). Lisa Olivas,     Note was generated with the assistance of voice recognition software. Document was reviewed however may contain grammatical errors.

## 2022-04-19 NOTE — TELEPHONE ENCOUNTER
Patient just seen for OV today. States that she forgot to ask you for refill on Motrin 800mg- states she takes for muscle spasms.  No not see anything in chart for this     Motrin 800mg for muscle spasms

## 2022-04-26 ENCOUNTER — HOSPITAL ENCOUNTER (OUTPATIENT)
Dept: GENERAL RADIOLOGY | Age: 22
Discharge: HOME | End: 2022-04-28
Payer: COMMERCIAL

## 2022-04-26 ENCOUNTER — OFFICE VISIT (OUTPATIENT)
Dept: NEUROSURGERY | Age: 22
End: 2022-04-26
Payer: COMMERCIAL

## 2022-04-26 ENCOUNTER — HOSPITAL ENCOUNTER (OUTPATIENT)
Age: 22
Discharge: HOME | End: 2022-04-28
Payer: COMMERCIAL

## 2022-04-26 VITALS
TEMPERATURE: 98.2 F | SYSTOLIC BLOOD PRESSURE: 109 MMHG | WEIGHT: 172 LBS | HEART RATE: 74 BPM | DIASTOLIC BLOOD PRESSURE: 70 MMHG | OXYGEN SATURATION: 98 % | RESPIRATION RATE: 16 BRPM | BODY MASS INDEX: 24.08 KG/M2 | HEIGHT: 71 IN

## 2022-04-26 DIAGNOSIS — M54.40 ACUTE LOW BACK PAIN WITH SCIATICA, SCIATICA LATERALITY UNSPECIFIED, UNSPECIFIED BACK PAIN LATERALITY: ICD-10-CM

## 2022-04-26 DIAGNOSIS — M54.40 ACUTE LOW BACK PAIN WITH SCIATICA, SCIATICA LATERALITY UNSPECIFIED, UNSPECIFIED BACK PAIN LATERALITY: Primary | ICD-10-CM

## 2022-04-26 PROCEDURE — 72120 X-RAY BEND ONLY L-S SPINE: CPT

## 2022-04-26 PROCEDURE — 73521 X-RAY EXAM HIPS BI 2 VIEWS: CPT

## 2022-04-26 PROCEDURE — 99203 OFFICE O/P NEW LOW 30 MIN: CPT | Performed by: NEUROLOGICAL SURGERY

## 2022-04-26 NOTE — PROGRESS NOTES
[facility] NEUROSURGERY  [address]  [address] [facility] [address] [phone]       Chief Complaint:   Chief Complaint   Patient presents with    Back Pain     lumbar pain, CT done @ [address] [facility], pt has not had any PT or injections       HPI:     I had the pleasure of seeing Lynnette Hassan today in neurosurgical clinic. As you know, this 25year old delightful, single, childless, non-smoker and current radiology tech student presents with LBP. She endorses that sometime last February and 2021 she fell on the ice directly onto her back sustained injury. Likewise this past January she also fell while walking down steps and landed on her right buttock with a resultant ecchymosis. She was doing better but then in March while on vacation she mj from a prone position suddenly and fell to severe pull in her low back. Upon specific questioning she states that she had a temporary numbness of her left toes from her mid metatarsal down on 15 April for which she sought the attention of physicians in the emergency room. She was prescribed Vicodin Valium and another muscle relaxant. She stopped the Vicodin as she felt it was ineffective. She has done a Medrol Dosepak but also without benefit. She denies any devaughn weakness. She does notice that at the end of the day she is tender to the touch in her anterior thighs bilaterally. She denies any loss of bowel or bladder function. She presents with a CT scan of the lumbar and thoracic spine and hand and work-up for her low back pain. History reviewed. No pertinent past medical history. History reviewed. No pertinent surgical history. History reviewed. No pertinent family history.    Social History     Socioeconomic History    Marital status: Single     Spouse name: Not on file    Number of children: Not on file    Years of education: Not on file    Highest education level: Not on file Occupational History    Not on file   Tobacco Use    Smoking status: Never Smoker    Smokeless tobacco: Never Used   Vaping Use    Vaping Use: Never used   Substance and Sexual Activity    Alcohol use: Never    Drug use: Never    Sexual activity: Not on file   Other Topics Concern    Not on file   Social History Narrative    Not on file     Social Determinants of Health     Financial Resource Strain:     Difficulty of Paying Living Expenses: Not on file   Food Insecurity:     Worried About Running Out of Food in the Last Year: Not on file    Regina of Food in the Last Year: Not on file   Transportation Needs:     Lack of Transportation (Medical): Not on file    Lack of Transportation (Non-Medical):  Not on file   Physical Activity:     Days of Exercise per Week: Not on file    Minutes of Exercise per Session: Not on file   Stress:     Feeling of Stress : Not on file   Social Connections:     Frequency of Communication with Friends and Family: Not on file    Frequency of Social Gatherings with Friends and Family: Not on file    Attends Confucianism Services: Not on file    Active Member of [address] [place] [address] or Organizations: Not on file    Attends Club or Organization Meetings: Not on file    Marital Status: Not on file   Intimate Partner Violence:     Fear of Current or Ex-Partner: Not on file    Emotionally Abused: Not on file    Physically Abused: Not on file    Sexually Abused: Not on file   Housing Stability:     Unable to Pay for Housing in the Last Year: Not on file    Number of [place] in the Last Year: Not on file    Unstable Housing in the Last Year: Not on file       Medications:   Current Outpatient Medications   Medication Sig Dispense Refill    ibuprofen (ADVIL;MOTRIN) 800 MG tablet Take 1 tablet by mouth every 12 hours as needed (muscle spasms) 50 tablet 0    FLUoxetine (PROZAC) 40 MG capsule take 1 capsule by mouth once daily 30 capsule 2    norgestimate-ethinyl estradiol (ORTHO-CYCLEN) 0.25-35 MG-MCG per tablet take 1 tablet by mouth once daily      EPINEPHrine (EPIPEN 2-ZAN) 0.3 MG/0.3ML SOAJ injection Inject 0.3 mLs into the muscle once for 1 dose Use as directed for allergic reaction 0.3 mL 3     No current facility-administered medications for this visit. Allergies:    Peanut-containing drug products       Review of Systems:    Denies any chest pain, headache, dyspnea, recent weight loss, fevers, chills or night sweats. Physical Examination:    /70   Pulse 74   Temp 98.2 °F (36.8 °C)   Resp 16   Ht 5' 11\" (1.803 m)   Wt 172 lb (78 kg)   SpO2 98%   BMI 23.99 kg/m²      On focused neurological examination, she  is awake alert oriented and rationally conversant. Speech is clear and fluent. Pupils are equal and reactive to light bilaterally, extraocular movements are intact, visual fields are full to confrontation. Her  face is symmetric and grossly intact to fine touch. Uvula and tongue are both midline. Shoulder shrug is symmetric and strong. Cervical spine is well aligned and nontender to direct palpation. She  can forward flex, extend , laterally rotate and laterally extend without limitation or pain. Motor examination reveals preserved power in the upper and lower extremities at 5 out of 5 throughout. Reflexes are symmetric and brisk. Plantar responses are downgoing. There is no clonus. Patient is intact to fine touch in all dermatomes throughout. Straight leg raise is negative. Palpation of the spine reveals no kyphotic or scoliotic gross deformities. She  can forward flex to well below the knee. There is no pain to deep percussion nor palpation. Wanda's test and direct distraction are negative. She is tender to palpation at her left SI joint.     ASSESSMENT:    I personally reviewed Erica ADDY Sotomayor's radiographic images, particularly her CT scan of the thoracic and lumbar spine dated 15 April 2022 which fails to show significant pathology and good spinal alignment. There may be L5-S1 disc bulge as well as an L4-5 disc bulge not clearly visualized with the study. 1. Acute low back pain with sciatica, sciatica laterality unspecified, unspecified back pain laterality  -     XR LUMBAR SPINE FLEXION AND EXTENSION ONLY; Future  -     XR HIP BILATERAL W AP PELVIS (2 VIEWS); Future  -     [facility]ever[facility] Pathak MD, Pain Medicine, [facility]; Future  -     [facility]      MEDICAL DECISION MAKING & PLAN:    I showed the patient and her mother present with her today as advocate the films and explained the findings. Like to obtain flexion-extension x-rays to ensure that no occult listhesis is occurring contributing to her pain. I did prescribe physical therapy as well as a pain management referral and the patient requested an MRI for definitive evaluation of her lumbar spine. All requisitions were ordered. And all questions were answered. She will return to the clinic with the above results in hand. Thank you so much for allowing us to participate in the care of this patient. Return in about 4 weeks (around 5/24/2022) for needs tests completed prior to appt, discuss results. Electronically signed by Keira Keys MD on 4/26/2022 at 1:34 PM       NOTE: This report was transcribed using voice recognition software.  Every effort was made to ensure accuracy; however, inadvertent computerized transcription errors may be present

## 2022-05-04 ENCOUNTER — OFFICE VISIT (OUTPATIENT)
Dept: PRIMARY CARE CLINIC | Age: 22
End: 2022-05-04
Payer: COMMERCIAL

## 2022-05-04 VITALS
DIASTOLIC BLOOD PRESSURE: 72 MMHG | HEART RATE: 86 BPM | OXYGEN SATURATION: 99 % | BODY MASS INDEX: 24.78 KG/M2 | SYSTOLIC BLOOD PRESSURE: 110 MMHG | WEIGHT: 177 LBS | TEMPERATURE: 97.6 F | HEIGHT: 71 IN

## 2022-05-04 DIAGNOSIS — M54.50 ACUTE MIDLINE LOW BACK PAIN WITHOUT SCIATICA: Primary | ICD-10-CM

## 2022-05-04 DIAGNOSIS — F41.9 ANXIETY: ICD-10-CM

## 2022-05-04 DIAGNOSIS — M51.36 DISC DEGENERATION, LUMBAR: ICD-10-CM

## 2022-05-04 PROCEDURE — 90471 IMMUNIZATION ADMIN: CPT | Performed by: FAMILY MEDICINE

## 2022-05-04 PROCEDURE — 99214 OFFICE O/P EST MOD 30 MIN: CPT | Performed by: FAMILY MEDICINE

## 2022-05-04 PROCEDURE — 90651 9VHPV VACCINE 2/3 DOSE IM: CPT | Performed by: FAMILY MEDICINE

## 2022-05-04 RX ORDER — FLUOXETINE HYDROCHLORIDE 40 MG/1
CAPSULE ORAL
Qty: 30 CAPSULE | Refills: 2 | Status: SHIPPED
Start: 2022-05-04 | End: 2022-08-02

## 2022-05-04 ASSESSMENT — ENCOUNTER SYMPTOMS
RESPIRATORY NEGATIVE: 1
BACK PAIN: 1
ALLERGIC/IMMUNOLOGIC NEGATIVE: 1
GASTROINTESTINAL NEGATIVE: 1
EYES NEGATIVE: 1

## 2022-05-04 NOTE — PROGRESS NOTES
22     [facility]    : 2000 Sex: female   Age: 25 y.o. Chief Complaint   Patient presents with    Back Pain     feeling a lot better    Discuss Labs     would like some labs for hormone testing    Anxiety    Immunizations     overdue for 2nd hpv vaccine       Prior to Admission medications    Medication Sig Start Date End Date Taking? Authorizing Provider   FLUoxetine (PROZAC) 40 MG capsule 1 qd 22  Yes Tee Caceres DO   ibuprofen (ADVIL;MOTRIN) 800 MG tablet Take 1 tablet by mouth every 12 hours as needed (muscle spasms) 22  Yes Ho Harry DO   norgestimate-ethinyl estradiol (ORTHO-CYCLEN) 0.25-35 MG-MCG per tablet take 1 tablet by mouth once daily 21  Yes Historical Provider, MD   EPINEPHrine (EPIPEN 2-ZAN) 0.3 MG/0.3ML SOAJ injection Inject 0.3 mLs into the muscle once for 1 dose Use as directed for allergic reaction 20  Nadiya Isbell DO          HPI: Patient evaluated today issues of low back pain and questionable degenerative disc disease lumbar spine. Recommended she follow through with the physical therapy and she is agreeable. MRI study has been ordered and awaiting completion of test.  Concerns for L5-S1 abnormality was confirmed on CT scan. Anxiety discussed and doing well on the Prozac we will maintain present dosing. Acne vulgaris remains concerned and she may be a candidate for Aldactone 25 daily and discuss further at next visit          Review of Systems   Constitutional: Negative. HENT: Negative. Eyes: Negative. Respiratory: Negative. Gastrointestinal: Negative. Endocrine: Negative. Genitourinary: Negative. Musculoskeletal: Positive for back pain and myalgias. Skin: Negative. Allergic/Immunologic: Negative. Neurological: Negative. Hematological: Negative. Psychiatric/Behavioral: Negative. Today systems review overall stable aside from back pains as noted.         Current Outpatient Medications:     FLUoxetine (PROZAC) 40 MG capsule, 1 qd, Disp: 30 capsule, Rfl: 2    ibuprofen (ADVIL;MOTRIN) 800 MG tablet, Take 1 tablet by mouth every 12 hours as needed (muscle spasms), Disp: 50 tablet, Rfl: 0    norgestimate-ethinyl estradiol (ORTHO-CYCLEN) 0.25-35 MG-MCG per tablet, take 1 tablet by mouth once daily, Disp: , Rfl:     EPINEPHrine (EPIPEN 2-ZAN) 0.3 MG/0.3ML SOAJ injection, Inject 0.3 mLs into the muscle once for 1 dose Use as directed for allergic reaction, Disp: 0.3 mL, Rfl: 3    Allergies   Allergen Reactions    Peanut-Containing Drug Products        Social History     Tobacco Use    Smoking status: Never Smoker    Smokeless tobacco: Never Used   Vaping Use    Vaping Use: Never used   Substance Use Topics    Alcohol use: Never    Drug use: Never      No past surgical history on file. No family history on file. No past medical history on file. Vitals:    05/04/22 1350   BP: 110/72   Pulse: 86   Temp: 97.6 °F (36.4 °C)   SpO2: 99%   Weight: 177 lb (80.3 kg)   Height: 5' 11\" (1.803 m)     BP Readings from Last 3 Encounters:   05/04/22 110/72   04/26/22 109/70   04/19/22 120/82        Physical Exam  Vitals and nursing note reviewed. Constitutional:       Appearance: She is well-developed. HENT:      Head: Normocephalic. Right Ear: External ear normal.      Left Ear: External ear normal.      Nose: Nose normal.   Eyes:      Conjunctiva/sclera: Conjunctivae normal.      Pupils: Pupils are equal, round, and reactive to light. Cardiovascular:      Rate and Rhythm: Normal rate. Pulmonary:      Breath sounds: Normal breath sounds. Abdominal:      General: Bowel sounds are normal.      Palpations: Abdomen is soft. Musculoskeletal:         General: Normal range of motion. Cervical back: Normal range of motion and neck supple. Skin:     General: Skin is warm and dry. Neurological:      Mental Status: She is alert and oriented to person, place, and time. Psychiatric:         Behavior: Behavior normal.     Today's vitals physical examination stable. Heart is controlled lungs are clear. Medications as prescribed. Reassessment 2 weeks and sooner if problems. Plan Per Assessment:  Charlene Noel was seen today for back pain, discuss labs, anxiety and immunizations. Diagnoses and all orders for this visit:    Acute midline low back pain without sciatica    Disc degeneration, lumbar    Anxiety    Other orders  -     FLUoxetine (PROZAC) 40 MG capsule; 1 qd  -     HPV Vaccine 9-valent IM            Return in about 2 weeks (around 5/18/2022). Zetta Dress, DO    Note was generated with the assistance of voice recognition software. Document was reviewed however may contain grammatical errors.

## 2022-05-17 ENCOUNTER — HOSPITAL ENCOUNTER (OUTPATIENT)
Dept: MRI IMAGING | Age: 22
Discharge: HOME | End: 2022-05-19
Payer: COMMERCIAL

## 2022-05-17 ENCOUNTER — HOSPITAL ENCOUNTER (OUTPATIENT)
Dept: PHYSICAL THERAPY | Age: 22
Setting detail: THERAPIES SERIES
Discharge: HOME | End: 2022-05-17
Payer: COMMERCIAL

## 2022-05-17 DIAGNOSIS — M54.40 ACUTE LOW BACK PAIN WITH SCIATICA, SCIATICA LATERALITY UNSPECIFIED, UNSPECIFIED BACK PAIN LATERALITY: ICD-10-CM

## 2022-05-17 PROCEDURE — 72148 MRI LUMBAR SPINE W/O DYE: CPT

## 2022-05-17 PROCEDURE — 97161 PT EVAL LOW COMPLEX 20 MIN: CPT

## 2022-05-17 NOTE — PROGRESS NOTES
Physical Therapy  Initial Assessment  Date: 2022  Patient Name: Lily Zambrano  MRN: [id number]  : 2000          Restrictions       Subjective   General  Chart Reviewed: Yes  Patient Assessed for Rehabilitation Services: Yes  Additional Pertinent Hx: Patient presents to PT to assess and treat symptoms of acute onset lower back/SI region pain. Patient reports current symptoms began suddenly 1+ mos ago. Upon rising from a prone posiution patient felt a sudden onset of pain. PMHx Patient reports no prior hx of back pathology but does report hx of back soreness related to athletic activity Recent CTscans and MRI  Self reported health status[de-identified] Good  History obtained from[de-identified] Patient,Chart Review  Family/Caregiver Present: No  Diagnosis: BAck Pain  Follows Commands: Within Functional Limits  PT Visit Information  Onset Date: 22  PT Insurance Information: BC/BS  Pain Screening  Patient Currently in Pain: No       Vision/Hearing  Vision  Vision: Within Functional Limits  Hearing  Hearing: Within functional limits    Orientation  Orientation  Overall Orientation Status: Within Functional Limits  Patient affect[de-identified] Normal  Follows Commands: Within Functional Limits    Social History  Social History  Lives With: Parent  Type of Home: House  Home Layout: Two level; Work area in basement    Functional Status  Functional Status  Prior level of function: Independent  Occupation: Full time employment  Homemaking Responsibilities: Yes  Active : Yes  Mode of Transportation: Car    Objective               Strength RLE  R Hip Flexion: 4+/5  R Hip Extension: 4-/5  R Hip ABduction: 3/5  Strength LLE  L Hip Flexion: 4-/5  L Hip Extension: 3/5  L Hip ABduction: 3/5  Tone  Trunk tone: Normotonic  RLE Tone: Normotonic  LLE Tone: Normotonic                   Ambulation  Surface: level tile  Device: No Device  Assistance: Modified Independent  Quality of Gait: Right Hip ER  More Ambulation?: No  Stairs/Curb  Stairs?: No                            N/A - Evaluation Only    Assessment   Conditions Requiring Skilled Therapeutic Intervention  Body Structures, Functions, Activity Limitations Requiring Skilled Therapeutic Intervention: Decreased functional mobility ; Decreased ROM; Decreased strength;Decreased endurance;Decreased posture; Increased pain  Therapy Prognosis: Fair         Plan   Plan  Plan weeks: 3-4  Current Treatment Recommendations: Strengthening,ROM,Functional mobility training,Endurance training,Neuromuscular re-education,Manual Therapy - Soft Tissue Mobilization,Patient/Caregiver education & training,Home exercise program,Modalities    G-Code       OutComes Score                                                  AM-PAC Score             Goals  Short Term Goals  Time Frame for Short term goals: 2 weeks  Short term goal 1: Patient will be able to engage in consistent and progressive active exercise while reporting no increase in back pain intensity  STG Goal 1 Status[de-identified] New  Short term goal 2: Establish HEP  STG Goal 2 Status[de-identified] New  Long Term Goals  Time Frame for Long term goals : 4-5 weeks  Long term goal 1: Patient will be able ot resume all normal ADL's while beign able to effectively contorl back pain at 2/10 or less Pain frequency Occasional or less  LTG Goal 1 Status[de-identified] New  Long term goal 2: AROM of the trunk and hips will be WFL's all ranges  LTG Goal 2 Status[de-identified] New  Long term goal 3: Patient will be able to maintain and self direct an approrpiate follow up idependent exercise program  LTG Goal 3 Status[de-identified] New  Patient Goals   Patient goals : Control Back Pain Resume normal function       Therapy Time   Individual Concurrent Group Co-treatment   Time In           Time Out           Minutes                   Lucas Mejía PT       Physical Therapy: Initial Evaluation    Patient: Jamel Foy (56 y.o. female)   Examination Date:   Plan of Care Certification Period: 2022 to        : 2000 ;    Confirmed: Yes MRN: [id number]  CSN: [id number]   Insurance: Payor: Odalis Gupta / Plan: Alberto Johnson PPO / Product Type: *No Product type* /   Insurance ID: [id number] - ([facility]) Secondary Insurance (if applicable):    Referring Physician: Rogelio Bo MD     PCP: Antonietta Moody, DO Visits to Date/Visits Approved:   /      No Show/Cancelled Appts:   /       Medical Diagnosis: Lumbago with sciatica, unspecified side [M54.40] BAck Pain  Treatment Diagnosis:       PERTINENT MEDICAL HISTORY   Patient Assessed for Rehabilitation Services: Yes  Self reported health status[de-identified] Good    Medical History: Chart Reviewed: Yes History reviewed. No pertinent past medical history. Surgical History: History reviewed. No pertinent surgical history. Medications:   Current Outpatient Medications:     FLUoxetine (PROZAC) 40 MG capsule, 1 qd, Disp: 30 capsule, Rfl: 2    ibuprofen (ADVIL;MOTRIN) 800 MG tablet, Take 1 tablet by mouth every 12 hours as needed (muscle spasms), Disp: 50 tablet, Rfl: 0    norgestimate-ethinyl estradiol (ORTHO-CYCLEN) 0.25-35 MG-MCG per tablet, take 1 tablet by mouth once daily, Disp: , Rfl:     EPINEPHrine (EPIPEN 2-ZAN) 0.3 MG/0.3ML SOAJ injection, Inject 0.3 mLs into the muscle once for 1 dose Use as directed for allergic reaction, Disp: 0.3 mL, Rfl: 3  Allergies: Peanut-containing drug products      SUBJECTIVE EXAMINATION     History obtained from[de-identified] Patient,Chart Review,      Family/Caregiver Present: No    Subjective History: Onset Date: 22  Subjective: Patient reports acute pain symptoms have modified and are approaching resolution but patinet has yet to resume all normal ADL's  Additional Pertinent Hx (if applicable): Patient presents to PT to assess and treat symptoms of acute onset lower back/SI region pain. Patient reports current symptoms began suddenly 1+ mos ago. Upon rising from a prone posiution patient felt a sudden onset of pain.  PMHx Patient reports no prior hx of back pathology but does report hx of back soreness related to athletic activity Recent CTscans and MRI   Prior diagnostic testing[de-identified] MRI,CT Scan,X-ray  Previous treatments prior to current episode?: Medications      Learning/Language: Learning  Does the patient/guardian have any barriers to learning?: No barriers  Will there be a co-learner?: No  What is the preferred language of the patient/guardian?: English  Is an  required?: No  How does the patient/guardian prefer to learn new concepts?: Listening     Pain Screening    Pain Screening  Patient Currently in Pain: No    Functional Status         Social History:    Social History  Lives With: Parent  Type of Home: House  Home Layout: Two level,Work area in basement    Occupation/Interests:   Occupation: Full time employment    Prior Level of Function:     Independent        Current Level of Function:          Homemaking Responsibilities: Yes  Active : Yes  Mode of Transportation: Car    OBJECTIVE EXAMINATION   Restrictions:              Review of Systems:  Vision: Within Functional Limits  Hearing: Within functional limits  Overall Orientation Status: Within Functional Limits  Patient affect[de-identified] Normal  Follows Commands: Within Functional Limits    VBI Screening / Lumbar Screening:         Regional Screen:         Observations:   General Observations  Description: Moderate pelvic obliquity with right iliac crest elevated vs left    Palpation:   Lumbar Spine Palpation: Pain left PSIS PAin left posteriro hip/Piriformis region  Left Hip Palpation: Pain with palpation posterior left hip region    Ambulation/Gait (if applicable):   Ambulation  Surface: level tile  Device: No Device  Assistance: Modified Independent  Quality of Gait: Right Hip ER  More Ambulation?: No  Stairs/Curb  Stairs?: No    Balance Screen:        Neuro Screen:   Not Assessed  Left AROM  Right AROM                    Left PROM  Right PROM                    Left Strength  Right Strength         Strength LLE  L Hip Flexion: 4-/5  L Hip Extension: 3/5  L Hip ABduction: 3/5    Strength RLE  R Hip Flexion: 4+/5  R Hip Extension: 4-/5  R Hip ABduction: 3/5     Cervical Assessment               Thoracic Assessment             Lumbar Assessment     AROM Lumbar Spine   Lumbar spine general AROM: Flexion limited 50% with pain Extension limited 50% with pain Pain noted left PSIS left lumbar quadrant       Trunk Strength     Trunk Strength  Trunk Flexion: 4-/5  Trunk Extension: 3/5     Muscle Length/Flexibility:      Joint Mobility (if applicable):        Special Tests:        Balance/Gait Assessment(s) Performed:   Not Assessed    Additional Finding(s) (if applicable):    NA       ASSESSMENT     Impression:      Body Structures, Functions, Activity Limitations Requiring Skilled Therapeutic Intervention: Decreased functional mobility ,Decreased ROM,Decreased strength,Decreased endurance,Decreased posture,Increased pain    Statement of Medical Necessity: Physical Therapy is both indicated and medically necessary as outlined in the POC to increase the likelihood of meeting the functionally related goals stated below. Patient's Activity Tolerance:        Patient's rehabilitation potential/prognosis is considered to be:  Fair    Factors which may impact rehabilitation potential include:          GOALS   Patient Goal(s):    Short Term Goals Completed by 2 weeks Goal Status   Patient will be able to engage in consistent and progressive active exercise while reporting no increase in back pain intensity New   Establish HEP New                         Long Term Goals Completed by 4-5 weeks Goal Status   Patient will be able ot resume all normal ADL's while beign able to effectively contorl back pain at 2/10 or less Pain frequency Occasional or less New   AROM of the trunk and hips will be WFL's all ranges New   Patient will be able to maintain and self direct an approrpiate follow up idependent exercise program New                    TREATMENT PLAN       Requires PT Follow-Up: Yes    Pt. actively involved in establishing Plan of Care and Goals: Yes  Patient/ Caregiver education and instruction:               Treatment may include any combination of the following: Strengthening,ROM,Functional mobility training,Endurance training,Neuromuscular re-education,Manual Therapy - Soft Tissue Mobilization,Patient/Caregiver education & training,Home exercise program,Modalities     Frequency / Duration:  Patient to be seen 1-2 for 3-4 weeks      Eval Complexity:    Decision Making: Low Complexity  Clinical Decision Making : Low Complexity    PT Treatment Completed:  N/A - Evaluation Only    Therapy Time  Individual Time In:         Individual Time Out:    Minutes: Therapist Signature: Alex Barnett PT    Date: 4/97/5193     I certify that the above Therapy Services are being furnished while the patient is under my care. I agree with the treatment plan and certify that this therapy is necessary. Physician's Signature:  ___________________________   Date:_______                                                                   Celestina Julian MD        Physician Comments: _______________________________________________    Please sign and return to [facility]. Please fax to the location listed below.  K[address] YOU for this referral!    [address] [facility]  [address] [place] [address]  Dept: [address]: [phone]       POC NOTE

## 2022-05-17 NOTE — PLAN OF CARE
[facility]  [facility]  [address] [address]  Dept: [phone]  Loc: [phone]    PHYSICAL THERAPY PLAN OF CARE: INITIAL EVALUATION    Patient: Catie Bowling (13 y.o. female)   Examination Date:   Plan of Care Certification Period: 2022 to        :  2000  MRN: [id number]  CSN: [id number]   Insurance: Payor: Natalie Mcclelland 150 / Plan: Natalie Mcclelland 150 - [place] PPO / Product Type: *No Product type* /   Insurance ID: [id number] - ([facility]) Secondary Insurance (if applicable):    Referring Physician: Garrick Austin MD     PCP: Ike Kingston DO Visits to Date/Visits Approved:   /      No Show/Cancelled Appts:   /       Medical Diagnosis: Lumbago with sciatica, unspecified side [M54.40] BAck Pain  No data recorded     SUBJECTIVE EXAMINATION      History obtained from[de-identified] Victor Manuel Doherty 4644 Review,      Family/Caregiver Present: No     Subjective History: Onset Date: 22  Subjective: Patient reports acute pain symptoms have modified and are approaching resolution but patinet has yet to resume all normal ADL's  Additional Pertinent Hx (if applicable): Patient presents to PT to assess and treat symptoms of acute onset lower back/SI region pain. Patient reports current symptoms began suddenly 1+ mos ago. Upon rising from a prone posiution patient felt a sudden onset of pain.  PMHx Patient reports no prior hx of back pathology but does report hx of back soreness related to athletic activity Recent CTscans and MRI   Prior diagnostic testing[de-identified] MRI,CT Scan,X-ray  Previous treatments prior to current episode?: Medications     ASSESSMENT      Impression:       Body Structures, Functions, Activity Limitations Requiring Skilled Therapeutic Intervention: Decreased functional mobility ,Decreased ROM,Decreased strength,Decreased endurance,Decreased posture,Increased pain     Statement of Medical Necessity: Physical Therapy is both indicated and medically necessary as outlined in the POC to increase the likelihood of meeting the functionally related goals stated below.      Patient's Activity Tolerance:        Patient's rehabilitation potential/prognosis is considered to be: Fair     Factors which may impact rehabilitation potential include:           Conditions Requiring Skilled Therapeutic Intervention  Body Structures, Functions, Activity Limitations Requiring Skilled Therapeutic Intervention: Decreased functional mobility ; Decreased ROM; Decreased strength;Decreased endurance;Decreased posture; Increased pain  Therapy Prognosis: Fair       Plan  Plan weeks: 3-4  Current Treatment Recommendations: Strengthening,ROM,Functional mobility training,Endurance training,Neuromuscular re-education,Manual Therapy - Soft Tissue Mobilization,Patient/Caregiver education & training,Home exercise program,Modalities    G-Code       OutComes Score                                                  AM-PAC Score             Goals  Short Term Goals  Time Frame for Short term goals: 2 weeks  Short term goal 1: Patient will be able to engage in consistent and progressive active exercise while reporting no increase in back pain intensity  STG Goal 1 Status[de-identified] New  Short term goal 2: Establish HEP  STG Goal 2 Status[de-identified] New  Long Term Goals  Time Frame for Long term goals : 4-5 weeks  Long term goal 1: Patient will be able ot resume all normal ADL's while beign able to effectively contorl back pain at 2/10 or less Pain frequency Occasional or less  LTG Goal 1 Status[de-identified] New  Long term goal 2: AROM of the trunk and hips will be WFL's all ranges  LTG Goal 2 Status[de-identified] New  Long term goal 3: Patient will be able to maintain and self direct an approrpiate follow up idependent exercise program  LTG Goal 3 Status[de-identified] New  Patient Goals   Patient goals : Control Back Pain Resume normal function      Patient Status: [x] Continue / Initiate Plan of Care     Signature: Electronically signed by Bernadette Ortiz PT on 5/17/2022 at 2:59 PM.     If you have any questions or concerns, please don't hesitate to call.   Thank you for your referral!

## 2022-05-18 ENCOUNTER — OFFICE VISIT (OUTPATIENT)
Dept: PRIMARY CARE CLINIC | Age: 22
End: 2022-05-18
Payer: COMMERCIAL

## 2022-05-18 VITALS — SYSTOLIC BLOOD PRESSURE: 120 MMHG | DIASTOLIC BLOOD PRESSURE: 82 MMHG | TEMPERATURE: 98 F | HEART RATE: 85 BPM

## 2022-05-18 DIAGNOSIS — F41.9 ANXIETY: Primary | ICD-10-CM

## 2022-05-18 DIAGNOSIS — J30.1 SEASONAL ALLERGIC RHINITIS DUE TO POLLEN: ICD-10-CM

## 2022-05-18 DIAGNOSIS — M51.36 DISC DEGENERATION, LUMBAR: ICD-10-CM

## 2022-05-18 PROCEDURE — 99214 OFFICE O/P EST MOD 30 MIN: CPT | Performed by: FAMILY MEDICINE

## 2022-05-18 RX ORDER — FLUTICASONE PROPIONATE 50 MCG
1 SPRAY, SUSPENSION (ML) NASAL DAILY
Qty: 16 G | Refills: 2 | Status: SHIPPED | OUTPATIENT
Start: 2022-05-18

## 2022-05-18 ASSESSMENT — PATIENT HEALTH QUESTIONNAIRE - PHQ9
SUM OF ALL RESPONSES TO PHQ QUESTIONS 1-9: 0
1. LITTLE INTEREST OR PLEASURE IN DOING THINGS: 0
SUM OF ALL RESPONSES TO PHQ9 QUESTIONS 1 & 2: 0
2. FEELING DOWN, DEPRESSED OR HOPELESS: 0
SUM OF ALL RESPONSES TO PHQ QUESTIONS 1-9: 0

## 2022-05-18 NOTE — PROGRESS NOTES
22     Prasad Abran    : 2000 Sex: female   Age: 25 y.o. Chief Complaint   Patient presents with    Results     mri results    Anxiety    Sinus Problem       Prior to Admission medications    Medication Sig Start Date End Date Taking? Authorizing Provider   fluticasone (FLONASE) 50 MCG/ACT nasal spray 1 spray by Each Nostril route daily 22  Yes Diann Caceres DO   FLUoxetine (PROZAC) 40 MG capsule 1 qd 22  Yes Diann Caceres DO   ibuprofen (ADVIL;MOTRIN) 800 MG tablet Take 1 tablet by mouth every 12 hours as needed (muscle spasms) 22  Yes Selina Romo DO   norgestimate-ethinyl estradiol (ORTHO-CYCLEN) 0.25-35 MG-MCG per tablet take 1 tablet by mouth once daily 21  Yes Historical Provider, MD   EPINEPHrine (EPIPEN 2-ZAN) 0.3 MG/0.3ML SOAJ injection Inject 0.3 mLs into the muscle once for 1 dose Use as directed for allergic reaction 20  Nadiya Isbell DO          HPI: Patient evaluated today with anxiety degenerative disc disease low back seasonal allergies. MRI reviewed today and mild degenerative change at L5-S1 level and we are planning just core exercise at this time and then will follow-up next month. Physical therapy was not approved so home exercise for now until next follow-up. Anxiety controlled continue fluoxetine at present dosing. Seasonal allergies recommending some Flonase in addition to over-the-counter Zyrtec and reassess with next visit. Review of Systems   Constitutional: Negative. HENT: Positive for congestion. Eyes: Negative. Respiratory: Negative. Gastrointestinal: Negative. Endocrine: Negative. Genitourinary: Negative. Musculoskeletal: Negative. Skin: Negative. Allergic/Immunologic: Negative. Neurological: Negative. Hematological: Negative. Psychiatric/Behavioral: Negative.                Current Outpatient Medications:     fluticasone (FLONASE) 50 MCG/ACT nasal spray, 1 spray by Each Nostril route daily, Disp: 16 g, Rfl: 2    FLUoxetine (PROZAC) 40 MG capsule, 1 qd, Disp: 30 capsule, Rfl: 2    ibuprofen (ADVIL;MOTRIN) 800 MG tablet, Take 1 tablet by mouth every 12 hours as needed (muscle spasms), Disp: 50 tablet, Rfl: 0    norgestimate-ethinyl estradiol (ORTHO-CYCLEN) 0.25-35 MG-MCG per tablet, take 1 tablet by mouth once daily, Disp: , Rfl:     EPINEPHrine (EPIPEN 2-ZAN) 0.3 MG/0.3ML SOAJ injection, Inject 0.3 mLs into the muscle once for 1 dose Use as directed for allergic reaction, Disp: 0.3 mL, Rfl: 3    Allergies   Allergen Reactions    Peanut-Containing Drug Products        Social History     Tobacco Use    Smoking status: Never Smoker    Smokeless tobacco: Never Used   Vaping Use    Vaping Use: Never used   Substance Use Topics    Alcohol use: Never    Drug use: Never      No past surgical history on file. No family history on file. No past medical history on file. Vitals:    05/18/22 1438   BP: 120/82   Pulse: 85   Temp: 98 °F (36.7 °C)     BP Readings from Last 3 Encounters:   05/18/22 120/82   05/04/22 110/72   04/26/22 109/70        Physical Exam  Vitals and nursing note reviewed. Constitutional:       Appearance: She is well-developed. HENT:      Head: Normocephalic. Right Ear: External ear normal.      Left Ear: External ear normal.      Nose: Nose normal.   Eyes:      Conjunctiva/sclera: Conjunctivae normal.      Pupils: Pupils are equal, round, and reactive to light. Cardiovascular:      Rate and Rhythm: Normal rate. Pulmonary:      Breath sounds: Normal breath sounds. Abdominal:      General: Bowel sounds are normal.      Palpations: Abdomen is soft. Musculoskeletal:         General: Normal range of motion. Cervical back: Normal range of motion and neck supple. Skin:     General: Skin is warm and dry. Neurological:      Mental Status: She is alert and oriented to person, place, and time.    Psychiatric:         Behavior: Behavior normal.     Today's vitals and physical examination overall stable. I will sit tight with current meds and care. Follow-up visit next 4 weeks and sooner if need be. Medications as noted. Home exercise program and physical therapy will be planned if we do not see adequate improvement. Plan Per Assessment:  Sandra Ferreira was seen today for results, anxiety and sinus problem. Diagnoses and all orders for this visit:    Anxiety    Disc degeneration, lumbar    Seasonal allergic rhinitis due to pollen    Other orders  -     fluticasone (FLONASE) 50 MCG/ACT nasal spray; 1 spray by Each Nostril route daily            Return in about 4 weeks (around 6/15/2022). Nancy Chavez DO    Note was generated with the assistance of voice recognition software. Document was reviewed however may contain grammatical errors.

## 2022-06-15 ENCOUNTER — OFFICE VISIT (OUTPATIENT)
Dept: PRIMARY CARE CLINIC | Age: 22
End: 2022-06-15
Payer: COMMERCIAL

## 2022-06-15 VITALS
BODY MASS INDEX: 24.92 KG/M2 | SYSTOLIC BLOOD PRESSURE: 110 MMHG | DIASTOLIC BLOOD PRESSURE: 72 MMHG | HEIGHT: 71 IN | OXYGEN SATURATION: 97 % | TEMPERATURE: 97.3 F | WEIGHT: 178 LBS | HEART RATE: 89 BPM

## 2022-06-15 DIAGNOSIS — F90.0 ATTENTION DEFICIT HYPERACTIVITY DISORDER (ADHD), PREDOMINANTLY INATTENTIVE TYPE: ICD-10-CM

## 2022-06-15 DIAGNOSIS — J30.2 SEASONAL ALLERGIES: Primary | ICD-10-CM

## 2022-06-15 DIAGNOSIS — F41.9 ANXIETY: ICD-10-CM

## 2022-06-15 PROCEDURE — 96372 THER/PROPH/DIAG INJ SC/IM: CPT | Performed by: FAMILY MEDICINE

## 2022-06-15 PROCEDURE — 99214 OFFICE O/P EST MOD 30 MIN: CPT | Performed by: FAMILY MEDICINE

## 2022-06-15 RX ORDER — TRIAMCINOLONE ACETONIDE 40 MG/ML
40 INJECTION, SUSPENSION INTRA-ARTICULAR; INTRAMUSCULAR ONCE
Status: COMPLETED | OUTPATIENT
Start: 2022-06-15 | End: 2022-06-15

## 2022-06-15 RX ADMIN — TRIAMCINOLONE ACETONIDE 40 MG: 40 INJECTION, SUSPENSION INTRA-ARTICULAR; INTRAMUSCULAR at 11:08

## 2022-06-15 NOTE — PROGRESS NOTES
6/15/22     Fritz Sierra    : 2000 Sex: female   Age: 25 y.o. Chief Complaint   Patient presents with    Allergies     discuss possible shot    ADHD     has been worse lately    Immunizations     may be due for 3rd HPV       Prior to Admission medications    Medication Sig Start Date End Date Taking? Authorizing Provider   fluticasone (FLONASE) 50 MCG/ACT nasal spray 1 spray by Each Nostril route daily 22  Yes Maddy Caceres DO   FLUoxetine (PROZAC) 40 MG capsule 1 qd 22  Yes Maddy Caceres DO   ibuprofen (ADVIL;MOTRIN) 800 MG tablet Take 1 tablet by mouth every 12 hours as needed (muscle spasms) 22  Yes Kay Cadena DO   norgestimate-ethinyl estradiol (ORTHO-CYCLEN) 0.25-35 MG-MCG per tablet take 1 tablet by mouth once daily 21  Yes Historical Provider, MD   EPINEPHrine (EPIPEN 2-ZAN) 0.3 MG/0.3ML SOAJ injection Inject 0.3 mLs into the muscle once for 1 dose Use as directed for allergic reaction 20  Nadiya Isbell DO          HPI: Seen today seasonal allergies and has done well with steroid injection in the past we will go ahead with Kenalog 40 mg x 1. Follow-up if necessary. Anxiety well controlled with Prozac at 40 a day and will maintain. Attention deficit issues we will try an over-the-counter vitamin supplement and consider the possibility of Strattera in the future if needed. Health maintenance and need of third HPV vaccination and will plan with next visit. Review of Systems   Constitutional: Negative. HENT: Negative. Eyes: Negative. Respiratory: Negative. Gastrointestinal: Negative. Endocrine: Negative. Genitourinary: Negative. Musculoskeletal: Negative. Skin: Negative. Allergic/Immunologic: Negative. Neurological: Negative. Hematological: Negative. Psychiatric/Behavioral: Negative. Today systems review overall stable medications as prescribed.         Current Outpatient Medications:     fluticasone (FLONASE) 50 MCG/ACT nasal spray, 1 spray by Each Nostril route daily, Disp: 16 g, Rfl: 2    FLUoxetine (PROZAC) 40 MG capsule, 1 qd, Disp: 30 capsule, Rfl: 2    ibuprofen (ADVIL;MOTRIN) 800 MG tablet, Take 1 tablet by mouth every 12 hours as needed (muscle spasms), Disp: 50 tablet, Rfl: 0    norgestimate-ethinyl estradiol (ORTHO-CYCLEN) 0.25-35 MG-MCG per tablet, take 1 tablet by mouth once daily, Disp: , Rfl:     EPINEPHrine (EPIPEN 2-ZAN) 0.3 MG/0.3ML SOAJ injection, Inject 0.3 mLs into the muscle once for 1 dose Use as directed for allergic reaction, Disp: 0.3 mL, Rfl: 3    Allergies   Allergen Reactions    Peanut-Containing Drug Products        Social History     Tobacco Use    Smoking status: Never Smoker    Smokeless tobacco: Never Used   Vaping Use    Vaping Use: Never used   Substance Use Topics    Alcohol use: Never    Drug use: Never      No past surgical history on file. No family history on file. No past medical history on file. Vitals:    06/15/22 1045   BP: 110/72   Pulse: 89   Temp: 97.3 °F (36.3 °C)   SpO2: 97%   Weight: 178 lb (80.7 kg)   Height: 5' 11\" (1.803 m)     BP Readings from Last 3 Encounters:   06/15/22 110/72   05/18/22 120/82   05/04/22 110/72        Physical Exam  Vitals and nursing note reviewed. Constitutional:       Appearance: She is well-developed. HENT:      Head: Normocephalic. Right Ear: External ear normal.      Left Ear: External ear normal.      Nose: Nose normal.   Eyes:      Conjunctiva/sclera: Conjunctivae normal.      Pupils: Pupils are equal, round, and reactive to light. Cardiovascular:      Rate and Rhythm: Normal rate. Pulmonary:      Breath sounds: Normal breath sounds. Abdominal:      General: Bowel sounds are normal.      Palpations: Abdomen is soft. Musculoskeletal:         General: Normal range of motion. Cervical back: Normal range of motion and neck supple.    Skin:     General: Skin is warm and dry.   Neurological:      Mental Status: She is alert and oriented to person, place, and time. Psychiatric:         Behavior: Behavior normal.     Today's vitals physical examination stable. We will maintain current meds and care. Follow-up visit 6 weeks and sooner if problems. Plan HPV vaccination with next visit. Kenalog injection today. Plan Per Assessment:  Hussein [place] was seen today for allergies, adhd and immunizations. Diagnoses and all orders for this visit:    Seasonal allergies  -     triamcinolone acetonide (KENALOG-40) injection 40 mg    Anxiety    Attention deficit hyperactivity disorder (ADHD), predominantly inattentive type            Return in about 6 weeks (around 7/27/2022). Jourdan Agee,     Note was generated with the assistance of voice recognition software. Document was reviewed however may contain grammatical errors.

## 2022-07-27 ENCOUNTER — OFFICE VISIT (OUTPATIENT)
Dept: PRIMARY CARE CLINIC | Age: 22
End: 2022-07-27
Payer: COMMERCIAL

## 2022-07-27 VITALS
SYSTOLIC BLOOD PRESSURE: 120 MMHG | OXYGEN SATURATION: 97 % | HEART RATE: 95 BPM | TEMPERATURE: 98.1 F | DIASTOLIC BLOOD PRESSURE: 78 MMHG

## 2022-07-27 DIAGNOSIS — F41.9 ANXIETY: Primary | ICD-10-CM

## 2022-07-27 DIAGNOSIS — F90.0 ATTENTION DEFICIT HYPERACTIVITY DISORDER (ADHD), PREDOMINANTLY INATTENTIVE TYPE: ICD-10-CM

## 2022-07-27 PROCEDURE — 99214 OFFICE O/P EST MOD 30 MIN: CPT | Performed by: FAMILY MEDICINE

## 2022-07-27 NOTE — PROGRESS NOTES
by Each Nostril route daily, Disp: 16 g, Rfl: 2    FLUoxetine (PROZAC) 40 MG capsule, 1 qd, Disp: 30 capsule, Rfl: 2    ibuprofen (ADVIL;MOTRIN) 800 MG tablet, Take 1 tablet by mouth every 12 hours as needed (muscle spasms), Disp: 50 tablet, Rfl: 0    norgestimate-ethinyl estradiol (ORTHO-CYCLEN) 0.25-35 MG-MCG per tablet, take 1 tablet by mouth once daily, Disp: , Rfl:     EPINEPHrine (EPIPEN 2-ZAN) 0.3 MG/0.3ML SOAJ injection, Inject 0.3 mLs into the muscle once for 1 dose Use as directed for allergic reaction, Disp: 0.3 mL, Rfl: 3    Allergies   Allergen Reactions    Peanut-Containing Drug Products        Social History     Tobacco Use    Smoking status: Never    Smokeless tobacco: Never   Vaping Use    Vaping Use: Never used   Substance Use Topics    Alcohol use: Never    Drug use: Never      No past surgical history on file. No family history on file. No past medical history on file. Vitals:    07/27/22 1024   BP: 120/78   Pulse: 95   Temp: 98.1 °F (36.7 °C)   SpO2: 97%     BP Readings from Last 3 Encounters:   07/27/22 120/78   06/15/22 110/72   05/18/22 120/82        Physical Exam  Vitals and nursing note reviewed. Constitutional:       Appearance: She is well-developed. HENT:      Head: Normocephalic. Right Ear: External ear normal.      Left Ear: External ear normal.      Nose: Nose normal.   Eyes:      Conjunctiva/sclera: Conjunctivae normal.      Pupils: Pupils are equal, round, and reactive to light. Cardiovascular:      Rate and Rhythm: Normal rate. Pulmonary:      Breath sounds: Normal breath sounds. Abdominal:      General: Bowel sounds are normal.      Palpations: Abdomen is soft. Musculoskeletal:         General: Normal range of motion. Cervical back: Normal range of motion and neck supple. Skin:     General: Skin is warm and dry. Neurological:      Mental Status: She is alert and oriented to person, place, and time.    Psychiatric:         Behavior: Behavior normal.

## 2022-08-02 RX ORDER — FLUOXETINE HYDROCHLORIDE 40 MG/1
CAPSULE ORAL
Qty: 30 CAPSULE | Refills: 2 | Status: SHIPPED | OUTPATIENT
Start: 2022-08-02

## 2022-09-07 ENCOUNTER — OFFICE VISIT (OUTPATIENT)
Dept: PRIMARY CARE CLINIC | Age: 22
End: 2022-09-07
Payer: COMMERCIAL

## 2022-09-07 VITALS
BODY MASS INDEX: 24.83 KG/M2 | OXYGEN SATURATION: 98 % | DIASTOLIC BLOOD PRESSURE: 80 MMHG | WEIGHT: 178 LBS | SYSTOLIC BLOOD PRESSURE: 118 MMHG | TEMPERATURE: 97.2 F | HEART RATE: 85 BPM

## 2022-09-07 DIAGNOSIS — Z23 NEED FOR HPV VACCINATION: ICD-10-CM

## 2022-09-07 DIAGNOSIS — F90.0 ATTENTION DEFICIT HYPERACTIVITY DISORDER (ADHD), PREDOMINANTLY INATTENTIVE TYPE: ICD-10-CM

## 2022-09-07 DIAGNOSIS — T78.40XD ALLERGY, SUBSEQUENT ENCOUNTER: ICD-10-CM

## 2022-09-07 DIAGNOSIS — Z23 NEED FOR TUBERCULOSIS VACCINATION: ICD-10-CM

## 2022-09-07 DIAGNOSIS — Z23 NEED FOR TUBERCULOSIS VACCINATION: Primary | ICD-10-CM

## 2022-09-07 DIAGNOSIS — Z01.84 IMMUNITY STATUS TESTING: ICD-10-CM

## 2022-09-07 DIAGNOSIS — F41.9 ANXIETY: ICD-10-CM

## 2022-09-07 PROBLEM — T78.40XA ALLERGIES: Status: ACTIVE | Noted: 2022-09-07

## 2022-09-07 PROCEDURE — 99214 OFFICE O/P EST MOD 30 MIN: CPT | Performed by: FAMILY MEDICINE

## 2022-09-07 PROCEDURE — 96372 THER/PROPH/DIAG INJ SC/IM: CPT | Performed by: FAMILY MEDICINE

## 2022-09-07 PROCEDURE — 90651 9VHPV VACCINE 2/3 DOSE IM: CPT | Performed by: FAMILY MEDICINE

## 2022-09-07 PROCEDURE — 86580 TB INTRADERMAL TEST: CPT | Performed by: FAMILY MEDICINE

## 2022-09-07 PROCEDURE — 90471 IMMUNIZATION ADMIN: CPT | Performed by: FAMILY MEDICINE

## 2022-09-07 RX ORDER — METHYLPREDNISOLONE ACETATE 40 MG/ML
40 INJECTION, SUSPENSION INTRA-ARTICULAR; INTRALESIONAL; INTRAMUSCULAR; SOFT TISSUE ONCE
Status: COMPLETED | OUTPATIENT
Start: 2022-09-07 | End: 2022-09-07

## 2022-09-07 RX ADMIN — METHYLPREDNISOLONE ACETATE 40 MG: 40 INJECTION, SUSPENSION INTRA-ARTICULAR; INTRALESIONAL; INTRAMUSCULAR; SOFT TISSUE at 16:28

## 2022-09-07 NOTE — PROGRESS NOTES
22     Byron Bynum    : 2000 Sex: female   Age: 25 y.o. Chief Complaint   Patient presents with    Anxiety       Prior to Admission medications    Medication Sig Start Date End Date Taking? Authorizing Provider   FLUoxetine (PROZAC) 40 MG capsule take 1 tablet by mouth once daily 22  Yes Sylvia Caceres DO   fluticasone [facility]) 50 MCG/ACT nasal spray 1 spray by Each Nostril route daily 22  Yes Sylvia Caceres DO   ibuprofen (ADVIL;MOTRIN) 800 MG tablet Take 1 tablet by mouth every 12 hours as needed (muscle spasms) 22  Yes Nanette Crawford DO   norgestimate-ethinyl estradiol (ORTHO-CYCLEN) 0.25-35 MG-MCG per tablet take 1 tablet by mouth once daily 21  Yes Historical Provider, MD   EPINEPHrine (EPIPEN 2-ZAN) 0.3 MG/0.3ML SOAJ injection Inject 0.3 mLs into the muscle once for 1 dose Use as directed for allergic reaction 20  Vinod Drew DO          HPI: Patient evaluated today multiple medical issues. Attention deficit anxiety controlled and medications will be maintained as prescribed. Routine testing today for hepatitis B titer for confirmation of immunity. Forms completed for radiology tech program.  Medically no restrictions. TB testing today. Review of Systems   Constitutional: Negative. HENT: Negative. Eyes: Negative. Respiratory: Negative. Gastrointestinal: Negative. Endocrine: Negative. Genitourinary: Negative. Musculoskeletal: Negative. Skin: Negative. Allergic/Immunologic: Negative. Neurological: Negative. Hematological: Negative. Psychiatric/Behavioral: Negative. Today systems review is all stable maintain current care.         Current Outpatient Medications:     FLUoxetine (PROZAC) 40 MG capsule, take 1 tablet by mouth once daily, Disp: 30 capsule, Rfl: 2    fluticasone (FLONASE) 50 MCG/ACT nasal spray, 1 spray by Each Nostril route daily, Disp: 16 g, Rfl: 2    ibuprofen (ADVIL;MOTRIN) 800 MG tablet, Take 1 tablet by mouth every 12 hours as needed (muscle spasms), Disp: 50 tablet, Rfl: 0    norgestimate-ethinyl estradiol (ORTHO-CYCLEN) 0.25-35 MG-MCG per tablet, take 1 tablet by mouth once daily, Disp: , Rfl:     EPINEPHrine (EPIPEN 2-ZAN) 0.3 MG/0.3ML SOAJ injection, Inject 0.3 mLs into the muscle once for 1 dose Use as directed for allergic reaction, Disp: 0.3 mL, Rfl: 3    Allergies   Allergen Reactions    Peanut-Containing Drug Products        Social History     Tobacco Use    Smoking status: Never    Smokeless tobacco: Never   Vaping Use    Vaping Use: Never used   Substance Use Topics    Alcohol use: Never    Drug use: Never      No past surgical history on file. No family history on file. No past medical history on file. Vitals:    09/07/22 1555   BP: 118/80   Pulse: 85   Temp: 97.2 °F (36.2 °C)   SpO2: 98%   Weight: 178 lb (80.7 kg)     BP Readings from Last 3 Encounters:   09/07/22 118/80   07/27/22 120/78   06/15/22 110/72        Physical Exam  Vitals and nursing note reviewed. Constitutional:       Appearance: She is well-developed. HENT:      Head: Normocephalic. Right Ear: External ear normal.      Left Ear: External ear normal.      Nose: Nose normal.   Eyes:      Conjunctiva/sclera: Conjunctivae normal.      Pupils: Pupils are equal, round, and reactive to light. Cardiovascular:      Rate and Rhythm: Normal rate. Pulmonary:      Breath sounds: Normal breath sounds. Abdominal:      General: Bowel sounds are normal.      Palpations: Abdomen is soft. Musculoskeletal:         General: Normal range of motion. Cervical back: Normal range of motion and neck supple. Skin:     General: Skin is warm and dry. Neurological:      Mental Status: She is alert and oriented to person, place, and time. Psychiatric:         Behavior: Behavior normal.   Today's vitals physical examination all stable. TB testing completed. Labs to be completed today. Medications reviewed as prescribed. Follow-up visit with me 3 months and sooner if problems. Plan Per Assessment:  [facility] was seen today for anxiety. Diagnoses and all orders for this visit:    Need for tuberculosis vaccination  -     Hepatitis B Surface Antibody; Future    Allergy, subsequent encounter  -     methylPREDNISolone acetate (DEPO-MEDROL) injection 40 mg    Immunity status testing  -     Miscellaneous Sendout; Future    Attention deficit hyperactivity disorder (ADHD), predominantly inattentive type    Anxiety    Other orders  -     Mantoux testing          No follow-ups on file. Glory Cables, DO    Note was generated with the assistance of voice recognition software. Document was reviewed however may contain grammatical errors.

## 2022-09-08 LAB — HBV SURFACE AB TITR SER: REACTIVE {TITER}

## 2022-09-09 ENCOUNTER — NURSE ONLY (OUTPATIENT)
Dept: PRIMARY CARE CLINIC | Age: 22
End: 2022-09-09

## 2022-09-09 ENCOUNTER — TELEPHONE (OUTPATIENT)
Dept: PRIMARY CARE CLINIC | Age: 22
End: 2022-09-09

## 2022-09-09 DIAGNOSIS — Z11.1 ENCOUNTER FOR PPD SKIN TEST READING: Primary | ICD-10-CM

## 2022-09-09 LAB
INDURATION: 0
TB SKIN TEST: NEGATIVE

## 2022-09-09 NOTE — TELEPHONE ENCOUNTER
Pt was in for PPD read and on immunization report Hep B is abnormal and was wondering if she needs a vaccine.

## 2022-10-07 PROBLEM — Z01.84 IMMUNITY STATUS TESTING: Status: RESOLVED | Noted: 2020-06-16 | Resolved: 2022-10-07

## 2022-11-01 ENCOUNTER — OFFICE VISIT (OUTPATIENT)
Dept: FAMILY MEDICINE CLINIC | Age: 22
End: 2022-11-01
Payer: COMMERCIAL

## 2022-11-01 VITALS
HEART RATE: 97 BPM | SYSTOLIC BLOOD PRESSURE: 120 MMHG | DIASTOLIC BLOOD PRESSURE: 70 MMHG | TEMPERATURE: 98.7 F | OXYGEN SATURATION: 98 %

## 2022-11-01 DIAGNOSIS — J02.9 ST (SORE THROAT): ICD-10-CM

## 2022-11-01 DIAGNOSIS — R09.82 PND (POST-NASAL DRIP): Primary | ICD-10-CM

## 2022-11-01 DIAGNOSIS — R49.0 RASPY VOICE: ICD-10-CM

## 2022-11-01 PROCEDURE — 99213 OFFICE O/P EST LOW 20 MIN: CPT | Performed by: FAMILY MEDICINE

## 2022-11-01 RX ORDER — AZITHROMYCIN 250 MG/1
TABLET, FILM COATED ORAL
Qty: 1 PACKET | Refills: 0 | Status: SHIPPED | OUTPATIENT
Start: 2022-11-01

## 2022-11-01 RX ORDER — PREDNISONE 1 MG/1
TABLET ORAL
Qty: 30 TABLET | Refills: 0 | Status: SHIPPED | OUTPATIENT
Start: 2022-11-01

## 2022-11-01 ASSESSMENT — ENCOUNTER SYMPTOMS
ALLERGIC/IMMUNOLOGIC NEGATIVE: 1
EYES NEGATIVE: 1
COUGH: 1
SORE THROAT: 1
GASTROINTESTINAL NEGATIVE: 1

## 2022-11-01 NOTE — PROGRESS NOTES
22     Emy Mcguire    : 2000 Sex: female   Age: 25 y.o. Chief Complaint   Patient presents with    Pharyngitis     X 1 day        Prior to Admission medications    Medication Sig Start Date End Date Taking? Authorizing Provider   azithromycin (ZITHROMAX Z-JASKARAN) 250 MG tablet 2 today  Then 1 qd  4 days 22  Yes Nir Hemp Mihaioff, DO   predniSONE (DELTASONE) 5 MG tablet 4 tablets daily for 3 days then 3 tablets daily for 3 days then 2 tablets daily for 3 days then 1 tablet daily for 3 days 22  Yes Nir Hemp Traikoff, DO   FLUoxetine (PROZAC) 40 MG capsule take 1 tablet by mouth once daily 22  Yes Nir Hemp Liveikoff, DO   fluticasone [facility]) 50 MCG/ACT nasal spray 1 spray by Each Nostril route daily 22  Yes Nir Hemp Traikoff, DO   ibuprofen (ADVIL;MOTRIN) 800 MG tablet Take 1 tablet by mouth every 12 hours as needed (muscle spasms) 22  Yes Renu Sanz,    norgestimate-ethinyl estradiol (ORTHO-CYCLEN) 0.25-35 MG-MCG per tablet take 1 tablet by mouth once daily 21  Yes Historical Provider, MD   EPINEPHrine (EPIPEN 2-JASKARAN) 0.3 MG/0.3ML SOAJ injection Inject 0.3 mLs into the muscle once for 1 dose Use as directed for allergic reaction 20  Nadiya Isbell DO          HPI: Patient seen today postnasal drip throat irritation some mild voice change. Findings consistent with URI sinus drainage. Treatment with Z-Jaskaran some prednisone and then if persistent to follow-up. She is fully vaccinated. No other concerning findings. Review of Systems   Constitutional: Negative. HENT:  Positive for congestion and sore throat. Eyes: Negative. Respiratory:  Positive for cough. Gastrointestinal: Negative. Endocrine: Negative. Genitourinary: Negative. Musculoskeletal: Negative. Skin: Negative. Allergic/Immunologic: Negative. Neurological: Negative. Hematological: Negative. Psychiatric/Behavioral: Negative. Current Outpatient Medications:     azithromycin (ZITHROMAX Z-ZAN) 250 MG tablet, 2 today  Then 1 qd  4 days, Disp: 1 packet, Rfl: 0    predniSONE (DELTASONE) 5 MG tablet, 4 tablets daily for 3 days then 3 tablets daily for 3 days then 2 tablets daily for 3 days then 1 tablet daily for 3 days, Disp: 30 tablet, Rfl: 0    FLUoxetine (PROZAC) 40 MG capsule, take 1 tablet by mouth once daily, Disp: 30 capsule, Rfl: 2    fluticasone (FLONASE) 50 MCG/ACT nasal spray, 1 spray by Each Nostril route daily, Disp: 16 g, Rfl: 2    ibuprofen (ADVIL;MOTRIN) 800 MG tablet, Take 1 tablet by mouth every 12 hours as needed (muscle spasms), Disp: 50 tablet, Rfl: 0    norgestimate-ethinyl estradiol (ORTHO-CYCLEN) 0.25-35 MG-MCG per tablet, take 1 tablet by mouth once daily, Disp: , Rfl:     EPINEPHrine (EPIPEN 2-ZAN) 0.3 MG/0.3ML SOAJ injection, Inject 0.3 mLs into the muscle once for 1 dose Use as directed for allergic reaction, Disp: 0.3 mL, Rfl: 3    Allergies   Allergen Reactions    Peanut-Containing Drug Products        Social History     Tobacco Use    Smoking status: Never    Smokeless tobacco: Never   Vaping Use    Vaping Use: Never used   Substance Use Topics    Alcohol use: Never    Drug use: Never      No past surgical history on file. No family history on file. No past medical history on file. Vitals:    11/01/22 1421   BP: 120/70   Pulse: 97   Temp: 98.7 °F (37.1 °C)   SpO2: 98%     BP Readings from Last 3 Encounters:   11/01/22 120/70   09/07/22 118/80   07/27/22 120/78        Physical Exam  Vitals and nursing note reviewed. Constitutional:       Appearance: She is well-developed. HENT:      Head: Normocephalic. Right Ear: External ear normal.      Left Ear: External ear normal.      Nose: Congestion present. Mouth/Throat:      Pharynx: Posterior oropharyngeal erythema present. Eyes:      Conjunctiva/sclera: Conjunctivae normal.      Pupils: Pupils are equal, round, and reactive to light. Cardiovascular:      Rate and Rhythm: Normal rate. Pulmonary:      Breath sounds: Normal breath sounds. Abdominal:      General: Bowel sounds are normal.      Palpations: Abdomen is soft. Musculoskeletal:         General: Normal range of motion. Cervical back: Normal range of motion and neck supple. Skin:     General: Skin is warm and dry. Neurological:      Mental Status: She is alert and oriented to person, place, and time. Psychiatric:         Behavior: Behavior normal.   Exam findings are stable. Treatment as noted and will follow-up with me in December and sooner if problems. Plan Per Assessment:  Cecy Sanchez was seen today for pharyngitis. Diagnoses and all orders for this visit:    PND (post-nasal drip)    ST (sore throat)  -     azithromycin (ZITHROMAX Z-ZAN) 250 MG tablet; 2 today  Then 1 qd  4 days    Raspy voice  -     azithromycin (ZITHROMAX Z-ZAN) 250 MG tablet; 2 today  Then 1 qd  4 days    Other orders  -     predniSONE (DELTASONE) 5 MG tablet; 4 tablets daily for 3 days then 3 tablets daily for 3 days then 2 tablets daily for 3 days then 1 tablet daily for 3 days          No follow-ups on file. Jessica Haney DO    Note was generated with the assistance of voice recognition software. Document was reviewed however may contain grammatical errors.

## 2022-11-11 RX ORDER — FLUOXETINE HYDROCHLORIDE 40 MG/1
CAPSULE ORAL
Qty: 30 CAPSULE | Refills: 5 | Status: SHIPPED | OUTPATIENT
Start: 2022-11-11

## 2022-12-08 ENCOUNTER — OFFICE VISIT (OUTPATIENT)
Dept: PRIMARY CARE CLINIC | Age: 22
End: 2022-12-08
Payer: COMMERCIAL

## 2022-12-08 VITALS
WEIGHT: 185 LBS | OXYGEN SATURATION: 98 % | SYSTOLIC BLOOD PRESSURE: 124 MMHG | BODY MASS INDEX: 25.9 KG/M2 | TEMPERATURE: 98.2 F | HEIGHT: 71 IN | DIASTOLIC BLOOD PRESSURE: 80 MMHG | HEART RATE: 80 BPM

## 2022-12-08 DIAGNOSIS — F41.9 ANXIETY: Primary | ICD-10-CM

## 2022-12-08 DIAGNOSIS — F90.0 ATTENTION DEFICIT HYPERACTIVITY DISORDER (ADHD), PREDOMINANTLY INATTENTIVE TYPE: ICD-10-CM

## 2022-12-08 DIAGNOSIS — F43.21 GRIEF: ICD-10-CM

## 2022-12-08 PROCEDURE — 99214 OFFICE O/P EST MOD 30 MIN: CPT | Performed by: FAMILY MEDICINE

## 2022-12-08 NOTE — PROGRESS NOTES
22     Rexie Paget    : 2000 Sex: female   Age: 25 y.o. Chief Complaint   Patient presents with    Anxiety       Prior to Admission medications    Medication Sig Start Date End Date Taking? Authorizing Provider   FLUoxetine (PROZAC) 40 MG capsule take 1 tablet by mouth once daily 22  Yes Tye Caceres DO   fluticasone [facility]) 50 MCG/ACT nasal spray 1 spray by Each Nostril route daily 22  Yes Tye Caceres DO   ibuprofen (ADVIL;MOTRIN) 800 MG tablet Take 1 tablet by mouth every 12 hours as needed (muscle spasms) 22  Yes Coby Sesay DO   norgestimate-ethinyl estradiol (ORTHO-CYCLEN) 0.25-35 MG-MCG per tablet take 1 tablet by mouth once daily 21  Yes Historical Provider, MD   EPINEPHrine (EPIPEN 2-ZAN) 0.3 MG/0.3ML SOAJ injection Inject 0.3 mLs into the muscle once for 1 dose Use as directed for allergic reaction 20  Malcolm Claudio DO          HPI: Patient seen today medical follow-up attention deficit anxiety grief. Worsening symptoms here in the past month. Loss of her grandma in October a year ago. Believe primary issues are grief as well as difficulty with some mild generalized anxiety. Encouraged possible yoga program exercise activity and then some home meditation if needed. Medications will be maintained as prescribed and further discussion when she returns. Review of Systems   Constitutional: Negative. HENT: Negative. Eyes: Negative. Respiratory: Negative. Gastrointestinal: Negative. Endocrine: Negative. Genitourinary: Negative. Musculoskeletal: Negative. Skin: Negative. Allergic/Immunologic: Negative. Neurological: Negative. Hematological: Negative. Psychiatric/Behavioral: Negative. Systems review stable aside from HPI complaints.         Current Outpatient Medications:     FLUoxetine (PROZAC) 40 MG capsule, take 1 tablet by mouth once daily, Disp: 30 capsule, Rfl: 5 fluticasone (FLONASE) 50 MCG/ACT nasal spray, 1 spray by Each Nostril route daily, Disp: 16 g, Rfl: 2    ibuprofen (ADVIL;MOTRIN) 800 MG tablet, Take 1 tablet by mouth every 12 hours as needed (muscle spasms), Disp: 50 tablet, Rfl: 0    norgestimate-ethinyl estradiol (ORTHO-CYCLEN) 0.25-35 MG-MCG per tablet, take 1 tablet by mouth once daily, Disp: , Rfl:     EPINEPHrine (EPIPEN 2-ZAN) 0.3 MG/0.3ML SOAJ injection, Inject 0.3 mLs into the muscle once for 1 dose Use as directed for allergic reaction, Disp: 0.3 mL, Rfl: 3    Allergies   Allergen Reactions    Peanut-Containing Drug Products        Social History     Tobacco Use    Smoking status: Never    Smokeless tobacco: Never   Vaping Use    Vaping Use: Never used   Substance Use Topics    Alcohol use: Never    Drug use: Never      No past surgical history on file. No family history on file. No past medical history on file. Vitals:    12/08/22 1521   BP: 124/80   Pulse: 80   Temp: 98.2 °F (36.8 °C)   SpO2: 98%   Weight: 185 lb (83.9 kg)   Height: 5' 11\" (1.803 m)     BP Readings from Last 3 Encounters:   12/08/22 124/80   11/01/22 120/70   09/07/22 118/80        Physical Exam  Vitals and nursing note reviewed. Constitutional:       Appearance: She is well-developed. HENT:      Head: Normocephalic. Right Ear: External ear normal.      Left Ear: External ear normal.      Nose: Nose normal.   Eyes:      Conjunctiva/sclera: Conjunctivae normal.      Pupils: Pupils are equal, round, and reactive to light. Cardiovascular:      Rate and Rhythm: Normal rate. Pulmonary:      Breath sounds: Normal breath sounds. Abdominal:      General: Bowel sounds are normal.      Palpations: Abdomen is soft. Musculoskeletal:         General: Normal range of motion. Cervical back: Normal range of motion and neck supple. Skin:     General: Skin is warm and dry. Neurological:      Mental Status: She is alert and oriented to person, place, and time. Psychiatric:         Behavior: Behavior normal.      Today's vitals physical exam stable. Heart was regular lungs are clear. Medications as prescribed. Change with home activities meditation yoga reassessment with me about 4 weeks and then we would consider adjustment of medication as well if needed. Patient in agreement. Plan Per Assessment:  Juan C Nascimento was seen today for anxiety. Diagnoses and all orders for this visit:    Anxiety    Grief    Attention deficit hyperactivity disorder (ADHD), predominantly inattentive type          Return in about 4 weeks (around 1/5/2023). Tanja Lara DO    Note was generated with the assistance of voice recognition software. Document was reviewed however may contain grammatical errors.

## 2023-01-05 ENCOUNTER — OFFICE VISIT (OUTPATIENT)
Dept: PRIMARY CARE CLINIC | Age: 23
End: 2023-01-05
Payer: COMMERCIAL

## 2023-01-05 VITALS
OXYGEN SATURATION: 98 % | WEIGHT: 182 LBS | BODY MASS INDEX: 25.48 KG/M2 | TEMPERATURE: 98 F | DIASTOLIC BLOOD PRESSURE: 70 MMHG | HEART RATE: 74 BPM | HEIGHT: 71 IN | SYSTOLIC BLOOD PRESSURE: 110 MMHG

## 2023-01-05 DIAGNOSIS — F41.9 ANXIETY: Primary | ICD-10-CM

## 2023-01-05 DIAGNOSIS — F90.0 ATTENTION DEFICIT HYPERACTIVITY DISORDER (ADHD), PREDOMINANTLY INATTENTIVE TYPE: ICD-10-CM

## 2023-01-05 PROCEDURE — 99214 OFFICE O/P EST MOD 30 MIN: CPT | Performed by: FAMILY MEDICINE

## 2023-01-05 NOTE — PROGRESS NOTES
23     Emily Mejias    : 2000 Sex: female   Age: 25 y.o. Chief Complaint   Patient presents with    Anxiety       Prior to Admission medications    Medication Sig Start Date End Date Taking? Authorizing Provider   FLUoxetine (PROZAC) 40 MG capsule take 1 tablet by mouth once daily 22  Yes Reynaldo Caceres DO   fluticasone [facility]) 50 MCG/ACT nasal spray 1 spray by Each Nostril route daily 22  Yes Reynaldo Caceres DO   ibuprofen (ADVIL;MOTRIN) 800 MG tablet Take 1 tablet by mouth every 12 hours as needed (muscle spasms) 22  Yes Yvan Leon DO   norgestimate-ethinyl estradiol (ORTHO-CYCLEN) 0.25-35 MG-MCG per tablet take 1 tablet by mouth once daily 21  Yes Historical Provider, MD   EPINEPHrine (EPIPEN 2-ZAN) 0.3 MG/0.3ML SOAJ injection Inject 0.3 mLs into the muscle once for 1 dose Use as directed for allergic reaction 20  Nadiya Isbell DO          HPI:           Review of Systems   Constitutional: Negative. HENT: Negative. Eyes: Negative. Respiratory: Negative. Gastrointestinal: Negative. Endocrine: Negative. Genitourinary: Negative. Musculoskeletal: Negative. Skin: Negative. Allergic/Immunologic: Negative. Neurological: Negative. Hematological: Negative. Psychiatric/Behavioral: Negative.               Current Outpatient Medications:     FLUoxetine (PROZAC) 40 MG capsule, take 1 tablet by mouth once daily, Disp: 30 capsule, Rfl: 5    fluticasone (FLONASE) 50 MCG/ACT nasal spray, 1 spray by Each Nostril route daily, Disp: 16 g, Rfl: 2    ibuprofen (ADVIL;MOTRIN) 800 MG tablet, Take 1 tablet by mouth every 12 hours as needed (muscle spasms), Disp: 50 tablet, Rfl: 0    norgestimate-ethinyl estradiol (ORTHO-CYCLEN) 0.25-35 MG-MCG per tablet, take 1 tablet by mouth once daily, Disp: , Rfl:     EPINEPHrine (EPIPEN 2-ZAN) 0.3 MG/0.3ML SOAJ injection, Inject 0.3 mLs into the muscle once for 1 dose Use as directed for allergic reaction, Disp: 0.3 mL, Rfl: 3    Allergies   Allergen Reactions    Peanut-Containing Drug Products        Social History     Tobacco Use    Smoking status: Never    Smokeless tobacco: Never   Vaping Use    Vaping Use: Never used   Substance Use Topics    Alcohol use: Never    Drug use: Never      No past surgical history on file. No family history on file. No past medical history on file. Vitals:    01/05/23 1039   BP: 110/70   Pulse: 74   Temp: 98 °F (36.7 °C)   SpO2: 98%   Weight: 182 lb (82.6 kg)   Height: 5' 11\" (1.803 m)     BP Readings from Last 3 Encounters:   01/05/23 110/70   12/08/22 124/80   11/01/22 120/70        Physical Exam  Vitals and nursing note reviewed. Constitutional:       Appearance: She is well-developed. HENT:      Head: Normocephalic. Right Ear: External ear normal.      Left Ear: External ear normal.      Nose: Nose normal.   Eyes:      Conjunctiva/sclera: Conjunctivae normal.      Pupils: Pupils are equal, round, and reactive to light. Cardiovascular:      Rate and Rhythm: Normal rate. Pulmonary:      Breath sounds: Normal breath sounds. Abdominal:      General: Bowel sounds are normal.      Palpations: Abdomen is soft. Musculoskeletal:         General: Normal range of motion. Cervical back: Normal range of motion and neck supple. Skin:     General: Skin is warm and dry. Neurological:      Mental Status: She is alert and oriented to person, place, and time. Psychiatric:         Behavior: Behavior normal.                Plan Per Assessment:  Rin Ramírez was seen today for anxiety. Diagnoses and all orders for this visit:    Anxiety    Attention deficit hyperactivity disorder (ADHD), predominantly inattentive type          No follow-ups on file. Yvan Leon DO    Note was generated with the assistance of voice recognition software. Document was reviewed however may contain grammatical errors.

## 2023-01-05 NOTE — PROGRESS NOTES
23     Mina Araiza    : 2000 Sex: female   Age: 25 y.o. Chief Complaint   Patient presents with    Anxiety       Prior to Admission medications    Medication Sig Start Date End Date Taking? Authorizing Provider   FLUoxetine (PROZAC) 40 MG capsule take 1 tablet by mouth once daily 22  Yes Salina Caceres DO   fluticasone [facility]) 50 MCG/ACT nasal spray 1 spray by Each Nostril route daily 22  Yes aSlina Caceres DO   ibuprofen (ADVIL;MOTRIN) 800 MG tablet Take 1 tablet by mouth every 12 hours as needed (muscle spasms) 22  Yes Betty Brennan DO   norgestimate-ethinyl estradiol (ORTHO-CYCLEN) 0.25-35 MG-MCG per tablet take 1 tablet by mouth once daily 21  Yes Historical Provider, MD   EPINEPHrine (EPIPEN 2-ZAN) 0.3 MG/0.3ML SOAJ injection Inject 0.3 mLs into the muscle once for 1 dose Use as directed for allergic reaction 20  Mak Ruiz DO          HPI: Erica seen today in follow-up anxiety attention deficit some mild postnasal drip with hoarse voice. Recommended some Mucinex DM and then if persistent will follow-up. No evidence of infection. Anxiety well controlled. Grieving process with her grandmother currently in the process of passing and emotional support provided. Review of Systems   Constitutional: Negative. HENT: Negative. Eyes: Negative. Respiratory: Negative. Gastrointestinal: Negative. Endocrine: Negative. Genitourinary: Negative. Musculoskeletal: Negative. Skin: Negative. Allergic/Immunologic: Negative. Neurological: Negative. Hematological: Negative. Psychiatric/Behavioral: Negative. Today systems review overall stable medications as prescribed.         Current Outpatient Medications:     FLUoxetine (PROZAC) 40 MG capsule, take 1 tablet by mouth once daily, Disp: 30 capsule, Rfl: 5    fluticasone (FLONASE) 50 MCG/ACT nasal spray, 1 spray by Each Nostril route daily, Disp: 16 g, Rfl: 2    ibuprofen (ADVIL;MOTRIN) 800 MG tablet, Take 1 tablet by mouth every 12 hours as needed (muscle spasms), Disp: 50 tablet, Rfl: 0    norgestimate-ethinyl estradiol (ORTHO-CYCLEN) 0.25-35 MG-MCG per tablet, take 1 tablet by mouth once daily, Disp: , Rfl:     EPINEPHrine (EPIPEN 2-ZAN) 0.3 MG/0.3ML SOAJ injection, Inject 0.3 mLs into the muscle once for 1 dose Use as directed for allergic reaction, Disp: 0.3 mL, Rfl: 3    Allergies   Allergen Reactions    Peanut-Containing Drug Products        Social History     Tobacco Use    Smoking status: Never    Smokeless tobacco: Never   Vaping Use    Vaping Use: Never used   Substance Use Topics    Alcohol use: Never    Drug use: Never      No past surgical history on file. No family history on file. No past medical history on file. Vitals:    01/05/23 1039   BP: 110/70   Pulse: 74   Temp: 98 °F (36.7 °C)   SpO2: 98%   Weight: 182 lb (82.6 kg)   Height: 5' 11\" (1.803 m)     BP Readings from Last 3 Encounters:   01/05/23 110/70   12/08/22 124/80   11/01/22 120/70        Physical Exam  Vitals and nursing note reviewed. Constitutional:       Appearance: She is well-developed. HENT:      Head: Normocephalic. Right Ear: External ear normal.      Left Ear: External ear normal.      Nose: Nose normal.   Eyes:      Conjunctiva/sclera: Conjunctivae normal.      Pupils: Pupils are equal, round, and reactive to light. Cardiovascular:      Rate and Rhythm: Normal rate. Pulmonary:      Breath sounds: Normal breath sounds. Abdominal:      General: Bowel sounds are normal.      Palpations: Abdomen is soft. Musculoskeletal:         General: Normal range of motion. Cervical back: Normal range of motion and neck supple. Skin:     General: Skin is warm and dry. Neurological:      Mental Status: She is alert and oriented to person, place, and time. Psychiatric:         Behavior: Behavior normal.      Today's vitals physical exam stable.   Heart is regular lungs are clear. Medications as prescribed. Follow-up visit 3 months and sooner if problems. Weight remains stable continue encourage diet and exercise. Fluoxetine maintain 40 a day. Plan Per Assessment:  Uzma Beasley was seen today for anxiety. Diagnoses and all orders for this visit:    Anxiety    Attention deficit hyperactivity disorder (ADHD), predominantly inattentive type          Return in about 3 months (around 4/5/2023). Maida Xiong, DO    Note was generated with the assistance of voice recognition software. Document was reviewed however may contain grammatical errors.

## 2023-04-04 ENCOUNTER — OFFICE VISIT (OUTPATIENT)
Dept: PRIMARY CARE CLINIC | Age: 23
End: 2023-04-04
Payer: COMMERCIAL

## 2023-04-04 VITALS
WEIGHT: 181 LBS | DIASTOLIC BLOOD PRESSURE: 82 MMHG | BODY MASS INDEX: 25.24 KG/M2 | TEMPERATURE: 98.2 F | OXYGEN SATURATION: 97 % | HEART RATE: 75 BPM | SYSTOLIC BLOOD PRESSURE: 120 MMHG

## 2023-04-04 DIAGNOSIS — R63.5 WEIGHT GAIN: ICD-10-CM

## 2023-04-04 DIAGNOSIS — J30.1 SEASONAL ALLERGIC RHINITIS DUE TO POLLEN: ICD-10-CM

## 2023-04-04 DIAGNOSIS — F41.9 ANXIETY: ICD-10-CM

## 2023-04-04 DIAGNOSIS — J01.00 ACUTE NON-RECURRENT MAXILLARY SINUSITIS: Primary | ICD-10-CM

## 2023-04-04 PROCEDURE — 96372 THER/PROPH/DIAG INJ SC/IM: CPT | Performed by: FAMILY MEDICINE

## 2023-04-04 PROCEDURE — 99214 OFFICE O/P EST MOD 30 MIN: CPT | Performed by: FAMILY MEDICINE

## 2023-04-04 RX ORDER — FLUOXETINE HYDROCHLORIDE 40 MG/1
40 CAPSULE ORAL DAILY
Qty: 30 CAPSULE | Refills: 4 | Status: SHIPPED | OUTPATIENT
Start: 2023-04-04

## 2023-04-04 RX ORDER — METHYLPREDNISOLONE ACETATE 40 MG/ML
40 INJECTION, SUSPENSION INTRA-ARTICULAR; INTRALESIONAL; INTRAMUSCULAR; SOFT TISSUE ONCE
Status: COMPLETED | OUTPATIENT
Start: 2023-04-04 | End: 2023-04-04

## 2023-04-04 RX ORDER — BUPROPION HYDROCHLORIDE 150 MG/1
150 TABLET ORAL EVERY MORNING
Qty: 30 TABLET | Refills: 3 | Status: SHIPPED | OUTPATIENT
Start: 2023-04-04

## 2023-04-04 RX ORDER — AZITHROMYCIN 250 MG/1
TABLET, FILM COATED ORAL
Qty: 1 PACKET | Refills: 0 | Status: SHIPPED | OUTPATIENT
Start: 2023-04-04

## 2023-04-04 RX ADMIN — METHYLPREDNISOLONE ACETATE 40 MG: 40 INJECTION, SUSPENSION INTRA-ARTICULAR; INTRALESIONAL; INTRAMUSCULAR; SOFT TISSUE at 16:12

## 2023-04-04 SDOH — ECONOMIC STABILITY: FOOD INSECURITY: WITHIN THE PAST 12 MONTHS, YOU WORRIED THAT YOUR FOOD WOULD RUN OUT BEFORE YOU GOT MONEY TO BUY MORE.: PATIENT DECLINED

## 2023-04-04 SDOH — ECONOMIC STABILITY: FOOD INSECURITY: WITHIN THE PAST 12 MONTHS, THE FOOD YOU BOUGHT JUST DIDN'T LAST AND YOU DIDN'T HAVE MONEY TO GET MORE.: PATIENT DECLINED

## 2023-04-04 SDOH — ECONOMIC STABILITY: INCOME INSECURITY: HOW HARD IS IT FOR YOU TO PAY FOR THE VERY BASICS LIKE FOOD, HOUSING, MEDICAL CARE, AND HEATING?: PATIENT DECLINED

## 2023-04-04 SDOH — ECONOMIC STABILITY: HOUSING INSECURITY
IN THE LAST 12 MONTHS, WAS THERE A TIME WHEN YOU DID NOT HAVE A STEADY PLACE TO SLEEP OR SLEPT IN A SHELTER (INCLUDING NOW)?: PATIENT REFUSED

## 2023-04-04 ASSESSMENT — ENCOUNTER SYMPTOMS
EYES NEGATIVE: 1
COUGH: 1
GASTROINTESTINAL NEGATIVE: 1
ALLERGIC/IMMUNOLOGIC NEGATIVE: 1

## 2023-04-04 ASSESSMENT — PATIENT HEALTH QUESTIONNAIRE - PHQ9
SUM OF ALL RESPONSES TO PHQ QUESTIONS 1-9: 0
1. LITTLE INTEREST OR PLEASURE IN DOING THINGS: 0
SUM OF ALL RESPONSES TO PHQ QUESTIONS 1-9: 0
SUM OF ALL RESPONSES TO PHQ QUESTIONS 1-9: 0
2. FEELING DOWN, DEPRESSED OR HOPELESS: 0
SUM OF ALL RESPONSES TO PHQ QUESTIONS 1-9: 0
SUM OF ALL RESPONSES TO PHQ9 QUESTIONS 1 & 2: 0

## 2023-04-04 NOTE — PROGRESS NOTES
Pupils: Pupils are equal, round, and reactive to light. Cardiovascular:      Rate and Rhythm: Normal rate. Pulmonary:      Breath sounds: Normal breath sounds. Abdominal:      General: Bowel sounds are normal.      Palpations: Abdomen is soft. Musculoskeletal:         General: Normal range of motion. Cervical back: Normal range of motion and neck supple. Skin:     General: Skin is warm and dry. Neurological:      Mental Status: She is alert and oriented to person, place, and time. Psychiatric:         Behavior: Behavior normal.   Physical exam overall stable. Respiratory congestion as noted Z-Zan as noted. Additional concerns with seasonal allergy and Depo-Medrol 40 mg IM today. Prozac to continue at current dose and the addition of some Wellbutrin  daily today possible appetite suppressant and reassess with next visit. Plan Per Assessment:  Uche Sahni was seen today for anxiety, congestion and other. Diagnoses and all orders for this visit:    Acute non-recurrent maxillary sinusitis    Seasonal allergic rhinitis due to pollen  -     methylPREDNISolone acetate (DEPO-MEDROL) injection 40 mg    Anxiety    Weight gain    Other orders  -     FLUoxetine (PROZAC) 40 MG capsule; Take 1 capsule by mouth daily  -     buPROPion (WELLBUTRIN XL) 150 MG extended release tablet; Take 1 tablet by mouth every morning  -     azithromycin (ZITHROMAX Z-ZAN) 250 MG tablet; 2 today  Then 1 qd  4 days            Return in about 4 weeks (around 5/2/2023). [place] Benjamin,     Note was generated with the assistance of voice recognition software. Document was reviewed however may contain grammatical errors.

## 2023-05-02 ENCOUNTER — OFFICE VISIT (OUTPATIENT)
Dept: PRIMARY CARE CLINIC | Age: 23
End: 2023-05-02
Payer: COMMERCIAL

## 2023-05-02 VITALS
DIASTOLIC BLOOD PRESSURE: 84 MMHG | HEART RATE: 91 BPM | TEMPERATURE: 98.5 F | HEIGHT: 71 IN | WEIGHT: 181 LBS | SYSTOLIC BLOOD PRESSURE: 124 MMHG | BODY MASS INDEX: 25.34 KG/M2 | OXYGEN SATURATION: 98 %

## 2023-05-02 DIAGNOSIS — F41.9 ANXIETY: Primary | ICD-10-CM

## 2023-05-02 DIAGNOSIS — F90.0 ATTENTION DEFICIT HYPERACTIVITY DISORDER (ADHD), PREDOMINANTLY INATTENTIVE TYPE: ICD-10-CM

## 2023-05-02 PROCEDURE — 99213 OFFICE O/P EST LOW 20 MIN: CPT | Performed by: FAMILY MEDICINE

## 2023-05-02 NOTE — PROGRESS NOTES
23     Vickie Dines    : 2000 Sex: female   Age: 26 y.o. Chief Complaint   Patient presents with    Anxiety     Pt here for check up. Prior to Admission medications    Medication Sig Start Date End Date Taking? Authorizing Provider   FLUoxetine (PROZAC) 40 MG capsule Take 1 capsule by mouth daily 23   Dayna Pelaez DO   buPROPion (WELLBUTRIN XL) 150 MG extended release tablet Take 1 tablet by mouth every morning 23   Dayna Pelaez DO   fluticasone [facility]) 50 MCG/ACT nasal spray 1 spray by Each Nostril route daily 22   Dayna Pelaez DO   ibuprofen (ADVIL;MOTRIN) 800 MG tablet Take 1 tablet by mouth every 12 hours as needed (muscle spasms) 22   Dayna Pelaez DO   norgestimate-ethinyl estradiol (ORTHO-CYCLEN) 0.25-35 MG-MCG per tablet take 1 tablet by mouth once daily 21   Historical Provider, MD   EPINEPHrine (EPIPEN 2-ZAN) 0.3 MG/0.3ML SOAJ injection Inject 0.3 mLs into the muscle once for 1 dose Use as directed for allergic reaction 20  Nadiya Isbell DO          HPI: Evaluated today in follow-up primarily on anxiety symptoms. She is actually doing quite well. Significant stressors primarily with work and school. 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 Emotional support provided and medications have been adjusted and she is doing well. Maintain as prescribed. Review of Systems   Constitutional: Negative. HENT: Negative. Eyes: Negative. Respiratory: Negative. Gastrointestinal: Negative. Endocrine: Negative. Genitourinary: Negative. Musculoskeletal: Negative. Skin: Negative. Allergic/Immunologic: Negative. Neurological: Negative. Hematological: Negative. Psychiatric/Behavioral: Negative.               Current Outpatient Medications:     FLUoxetine (PROZAC) 40 MG capsule, Take 1 capsule by mouth daily, Disp: 30 capsule, Rfl: 4    buPROPion

## 2023-06-02 LAB — PAP SMEAR, EXTERNAL: NEGATIVE

## 2023-07-21 RX ORDER — BUPROPION HYDROCHLORIDE 150 MG/1
150 TABLET ORAL EVERY MORNING
Qty: 30 TABLET | Refills: 3 | Status: SHIPPED | OUTPATIENT
Start: 2023-07-21

## 2023-09-01 ENCOUNTER — TELEPHONE (OUTPATIENT)
Dept: PRIMARY CARE CLINIC | Age: 23
End: 2023-09-01

## 2023-09-01 DIAGNOSIS — Z01.84 IMMUNITY STATUS TESTING: Primary | ICD-10-CM

## 2023-09-06 ENCOUNTER — HOSPITAL ENCOUNTER (OUTPATIENT)
Age: 23
Discharge: HOME | End: 2023-09-06
Payer: COMMERCIAL

## 2023-09-06 DIAGNOSIS — Z01.84 IMMUNITY STATUS TESTING: ICD-10-CM

## 2023-09-06 PROCEDURE — 86481 TB AG RESPONSE T-CELL SUSP: CPT

## 2023-09-11 LAB — T SPOT TB TEST: NORMAL

## 2023-09-27 ENCOUNTER — OFFICE VISIT (OUTPATIENT)
Dept: PRIMARY CARE CLINIC | Age: 23
End: 2023-09-27
Payer: COMMERCIAL

## 2023-09-27 VITALS
HEART RATE: 64 BPM | BODY MASS INDEX: 25.44 KG/M2 | RESPIRATION RATE: 17 BRPM | TEMPERATURE: 97.8 F | WEIGHT: 182.4 LBS | SYSTOLIC BLOOD PRESSURE: 110 MMHG | DIASTOLIC BLOOD PRESSURE: 70 MMHG | OXYGEN SATURATION: 96 %

## 2023-09-27 DIAGNOSIS — F90.0 ATTENTION DEFICIT HYPERACTIVITY DISORDER (ADHD), PREDOMINANTLY INATTENTIVE TYPE: ICD-10-CM

## 2023-09-27 DIAGNOSIS — F41.9 ANXIETY: Primary | ICD-10-CM

## 2023-09-27 DIAGNOSIS — K11.21 ACUTE SIALOADENITIS: ICD-10-CM

## 2023-09-27 PROCEDURE — 99214 OFFICE O/P EST MOD 30 MIN: CPT | Performed by: FAMILY MEDICINE

## 2023-09-27 RX ORDER — FLUOXETINE HYDROCHLORIDE 40 MG/1
40 CAPSULE ORAL DAILY
Qty: 30 CAPSULE | Refills: 4 | Status: SHIPPED | OUTPATIENT
Start: 2023-09-27

## 2023-09-27 RX ORDER — BUPROPION HYDROCHLORIDE 150 MG/1
150 TABLET ORAL EVERY MORNING
Qty: 30 TABLET | Refills: 3 | Status: SHIPPED | OUTPATIENT
Start: 2023-09-27

## 2023-09-27 ASSESSMENT — PATIENT HEALTH QUESTIONNAIRE - PHQ9
SUM OF ALL RESPONSES TO PHQ QUESTIONS 1-9: 0
SUM OF ALL RESPONSES TO PHQ QUESTIONS 1-9: 0
2. FEELING DOWN, DEPRESSED OR HOPELESS: 0
SUM OF ALL RESPONSES TO PHQ9 QUESTIONS 1 & 2: 0
SUM OF ALL RESPONSES TO PHQ QUESTIONS 1-9: 0
SUM OF ALL RESPONSES TO PHQ QUESTIONS 1-9: 0
1. LITTLE INTEREST OR PLEASURE IN DOING THINGS: 0

## 2023-09-27 NOTE — PROGRESS NOTES
Head: Normocephalic. Right Ear: External ear normal.      Left Ear: External ear normal.      Nose: Nose normal.   Eyes:      Conjunctiva/sclera: Conjunctivae normal.      Pupils: Pupils are equal, round, and reactive to light. Cardiovascular:      Rate and Rhythm: Normal rate. Pulmonary:      Breath sounds: Normal breath sounds. Abdominal:      General: Bowel sounds are normal.      Palpations: Abdomen is soft. Musculoskeletal:         General: Normal range of motion. Cervical back: Normal range of motion and neck supple. Skin:     General: Skin is warm and dry. Neurological:      Mental Status: She is alert and oriented to person, place, and time. Psychiatric:         Behavior: Behavior normal.     Today's vitals physical exam stable. Heart is regular lungs are clear. Medications as prescribed. Reassessment with me 3 months sooner if problems. She will be finishing school at the end of the year and starting position with [employer] system. Plan Per Assessment:  Laura Curry was seen today for follow-up, mouth lesions and other. Diagnoses and all orders for this visit:    Anxiety    Attention deficit hyperactivity disorder (ADHD), predominantly inattentive type    Acute sialoadenitis    Other orders  -     FLUoxetine (PROZAC) 40 MG capsule; Take 1 capsule by mouth daily  -     buPROPion (WELLBUTRIN XL) 150 MG extended release tablet; Take 1 tablet by mouth every morning            Return in about 3 months (around 12/27/2023). Bubba Loyd DO    Note was generated with the assistance of voice recognition software. Document was reviewed however may contain grammatical errors.

## 2023-10-04 ENCOUNTER — TELEPHONE (OUTPATIENT)
Dept: PRIMARY CARE CLINIC | Age: 23
End: 2023-10-04

## 2023-10-04 RX ORDER — AZITHROMYCIN 250 MG/1
250 TABLET, FILM COATED ORAL SEE ADMIN INSTRUCTIONS
Qty: 6 TABLET | Refills: 0 | Status: CANCELLED | OUTPATIENT
Start: 2023-10-04 | End: 2023-10-09

## 2023-10-06 RX ORDER — AZITHROMYCIN 250 MG/1
TABLET, FILM COATED ORAL
Qty: 1 PACKET | Refills: 0 | Status: SHIPPED | OUTPATIENT
Start: 2023-10-06

## 2023-12-27 ENCOUNTER — OFFICE VISIT (OUTPATIENT)
Dept: PRIMARY CARE CLINIC | Age: 23
End: 2023-12-27
Payer: COMMERCIAL

## 2023-12-27 VITALS
HEIGHT: 71 IN | OXYGEN SATURATION: 98 % | SYSTOLIC BLOOD PRESSURE: 100 MMHG | TEMPERATURE: 98 F | BODY MASS INDEX: 26.18 KG/M2 | HEART RATE: 86 BPM | WEIGHT: 187 LBS | DIASTOLIC BLOOD PRESSURE: 68 MMHG

## 2023-12-27 DIAGNOSIS — F41.9 ANXIETY: Primary | ICD-10-CM

## 2023-12-27 DIAGNOSIS — L74.510 AXILLARY HYPERHIDROSIS: ICD-10-CM

## 2023-12-27 DIAGNOSIS — F90.0 ATTENTION DEFICIT HYPERACTIVITY DISORDER (ADHD), PREDOMINANTLY INATTENTIVE TYPE: ICD-10-CM

## 2023-12-27 PROCEDURE — 99214 OFFICE O/P EST MOD 30 MIN: CPT | Performed by: FAMILY MEDICINE

## 2023-12-27 RX ORDER — FLUOXETINE HYDROCHLORIDE 40 MG/1
40 CAPSULE ORAL DAILY
Qty: 30 CAPSULE | Refills: 4 | Status: SHIPPED | OUTPATIENT
Start: 2023-12-27

## 2023-12-27 RX ORDER — BUPROPION HYDROCHLORIDE 150 MG/1
150 TABLET ORAL EVERY MORNING
Qty: 30 TABLET | Refills: 3 | Status: SHIPPED | OUTPATIENT
Start: 2023-12-27

## 2023-12-27 NOTE — PROGRESS NOTES
23     Wilton Ford    : 2000 Sex: female   Age: 26 y.o. Chief Complaint   Patient presents with    Anxiety    Medication Refill       Prior to Admission medications    Medication Sig Start Date End Date Taking? Authorizing Provider   buPROPion (WELLBUTRIN XL) 150 MG extended release tablet Take 1 tablet by mouth every morning 23  Yes Mirtha Caceres DO   FLUoxetine (PROZAC) 40 MG capsule Take 1 capsule by mouth daily 23  Yes Mirtha Caceres DO   ibuprofen (ADVIL;MOTRIN) 800 MG tablet Take 1 tablet by mouth every 12 hours as needed (muscle spasms) 22   Valerie Juárez DO   norgestimate-ethinyl estradiol (ORTHO-CYCLEN) 0.25-35 MG-MCG per tablet take 1 tablet by mouth once daily 21   Provider, MD Brianna   EPINEPHrine (EPIPEN 2-ZAN) 0.3 MG/0.3ML SOAJ injection Inject 0.3 mLs into the muscle once for 1 dose Use as directed for allergic reaction  Patient not taking: Reported on 20  Niyah Mcdowell DO          HPI: Patient seen today problems with anxiety attention deficit hyperhidrosis axillary region. Medications reviewed continue as prescribed. Systems review overall stable. Review of Systems   Constitutional: Negative. HENT: Negative. Eyes: Negative. Respiratory: Negative. Gastrointestinal: Negative. Endocrine: Negative. Genitourinary: Negative. Musculoskeletal: Negative. Skin: Negative. Allergic/Immunologic: Negative. Neurological: Negative. Hematological: Negative. Psychiatric/Behavioral: Negative.                 Current Outpatient Medications:     buPROPion (WELLBUTRIN XL) 150 MG extended release tablet, Take 1 tablet by mouth every morning, Disp: 30 tablet, Rfl: 3    FLUoxetine (PROZAC) 40 MG capsule, Take 1 capsule by mouth daily, Disp: 30 capsule, Rfl: 4    ibuprofen (ADVIL;MOTRIN) 800 MG tablet, Take 1 tablet by mouth every 12 hours as needed (muscle spasms), Disp: 50

## 2024-05-25 ENCOUNTER — OFFICE VISIT (OUTPATIENT)
Dept: FAMILY MEDICINE CLINIC | Age: 24
End: 2024-05-25
Payer: COMMERCIAL

## 2024-05-25 VITALS
BODY MASS INDEX: 26.04 KG/M2 | DIASTOLIC BLOOD PRESSURE: 76 MMHG | HEIGHT: 71 IN | SYSTOLIC BLOOD PRESSURE: 120 MMHG | WEIGHT: 186 LBS | TEMPERATURE: 98.3 F | HEART RATE: 88 BPM | OXYGEN SATURATION: 97 %

## 2024-05-25 DIAGNOSIS — K58.1 IRRITABLE BOWEL SYNDROME WITH CONSTIPATION: ICD-10-CM

## 2024-05-25 DIAGNOSIS — J30.1 SEASONAL ALLERGIC RHINITIS DUE TO POLLEN: Primary | ICD-10-CM

## 2024-05-25 PROCEDURE — 99213 OFFICE O/P EST LOW 20 MIN: CPT | Performed by: FAMILY MEDICINE

## 2024-05-25 RX ORDER — METHYLPREDNISOLONE ACETATE 80 MG/ML
80 INJECTION, SUSPENSION INTRA-ARTICULAR; INTRALESIONAL; INTRAMUSCULAR; SOFT TISSUE ONCE
Status: COMPLETED | OUTPATIENT
Start: 2024-05-25 | End: 2024-05-25

## 2024-05-25 RX ORDER — FLUTICASONE PROPIONATE 50 MCG
1 SPRAY, SUSPENSION (ML) NASAL 2 TIMES DAILY
Qty: 16 G | Refills: 5 | Status: SHIPPED | OUTPATIENT
Start: 2024-05-25

## 2024-05-25 RX ADMIN — METHYLPREDNISOLONE ACETATE 80 MG: 80 INJECTION, SUSPENSION INTRA-ARTICULAR; INTRALESIONAL; INTRAMUSCULAR; SOFT TISSUE at 09:37

## 2024-05-25 ASSESSMENT — ENCOUNTER SYMPTOMS
SHORTNESS OF BREATH: 0
RHINORRHEA: 1
SORE THROAT: 0
DIARRHEA: 0
EYE PAIN: 0
SINUS PRESSURE: 1
ABDOMINAL PAIN: 0
CHEST TIGHTNESS: 0
NAUSEA: 0
WHEEZING: 0
CONSTIPATION: 1
ABDOMINAL DISTENTION: 1
SINUS PAIN: 0
BACK PAIN: 0
VOMITING: 0
COUGH: 0
TROUBLE SWALLOWING: 0

## 2024-05-25 NOTE — PROGRESS NOTES
Erica Sotomayor (:  2000) is a 24 y.o. female,Established patient, here for evaluation of the following chief complaint(s):  Allergies (Pt states that she is having issues with her allergies normally gets an allergy shot but has not this year.) and Bloated (Pt states that she has always had bloating issues.)      Assessment & Plan   1. Seasonal allergic rhinitis due to pollen  Comments:  flonase allergra or zyrtec adn steroid injection today  Orders:  -     fluticasone (FLONASE) 50 MCG/ACT nasal spray; 1 spray by Each Nostril route in the morning and 1 spray in the evening., Disp-16 g, R-5Normal  -     methylPREDNISolone acetate (DEPO-MEDROL) injection 80 mg; 80 mg, IntraMUSCular, ONCE, 1 dose, On Sat 24 at 0945  2. Irritable bowel syndrome with constipation  Comments:  taking fiber and probiotic, try elimination diet daily then gluten, needs to see PCP      Return if symptoms worsen or fail to improve.       Subjective   Here with terrible allergies  Was on flonase but ran out and just taking allegra right  now  Lots of drainage and mucous  Sneezing and head congestioon  No fever    Also bloating  Constipation then normal bm  Has been taking probiotic an dfiber for a while without much help  Recommend elimination idet to see if dairy or gluten are causing her s/s  But needs to see TJT            Review of Systems   Constitutional:  Negative for appetite change, fatigue and fever.   HENT:  Positive for congestion, postnasal drip, rhinorrhea, sinus pressure and sneezing. Negative for ear pain, sinus pain, sore throat and trouble swallowing.    Eyes:  Negative for pain.   Respiratory:  Negative for cough, chest tightness, shortness of breath and wheezing.    Cardiovascular:  Negative for chest pain, palpitations and leg swelling.   Gastrointestinal:  Positive for abdominal distention and constipation. Negative for abdominal pain, diarrhea, nausea and vomiting.   Endocrine: Negative for cold

## 2024-06-03 RX ORDER — FLUOXETINE HYDROCHLORIDE 40 MG/1
40 CAPSULE ORAL DAILY
Qty: 30 CAPSULE | Refills: 4 | Status: SHIPPED | OUTPATIENT
Start: 2024-06-03

## 2024-06-06 ENCOUNTER — TELEPHONE (OUTPATIENT)
Dept: PRIMARY CARE CLINIC | Age: 24
End: 2024-06-06

## 2024-06-06 ENCOUNTER — PATIENT MESSAGE (OUTPATIENT)
Dept: PRIMARY CARE CLINIC | Age: 24
End: 2024-06-06

## 2024-06-06 DIAGNOSIS — R63.5 WEIGHT GAIN: ICD-10-CM

## 2024-06-06 DIAGNOSIS — F90.0 ATTENTION DEFICIT HYPERACTIVITY DISORDER (ADHD), PREDOMINANTLY INATTENTIVE TYPE: ICD-10-CM

## 2024-06-06 DIAGNOSIS — R53.83 OTHER FATIGUE: Primary | ICD-10-CM

## 2024-06-06 NOTE — TELEPHONE ENCOUNTER
----- Message from Katherine Sotomayor sent at 6/6/2024  9:14 AM EDT -----  Regarding: Hormone level bloodwork   Contact: [phone]  Hi there,  I was wondering if you can add to my bloodwork orders to have all of my hormone levels checked. I plan on getting them done tomorrow morning and would like a panel on those as well!   Thanks, katherine

## 2024-06-07 DIAGNOSIS — R53.83 OTHER FATIGUE: ICD-10-CM

## 2024-06-07 DIAGNOSIS — F90.0 ATTENTION DEFICIT HYPERACTIVITY DISORDER (ADHD), PREDOMINANTLY INATTENTIVE TYPE: ICD-10-CM

## 2024-06-07 DIAGNOSIS — R63.5 WEIGHT GAIN: ICD-10-CM

## 2024-06-07 LAB
ALBUMIN: 4 G/DL (ref 3.5–5.2)
ALP BLD-CCNC: 64 U/L (ref 35–104)
ALT SERPL-CCNC: 10 U/L (ref 0–32)
ANION GAP SERPL CALCULATED.3IONS-SCNC: 13 MMOL/L (ref 7–16)
AST SERPL-CCNC: 18 U/L (ref 0–31)
BASOPHILS ABSOLUTE: 0.04 K/UL (ref 0–0.2)
BASOPHILS RELATIVE PERCENT: 1 % (ref 0–2)
BILIRUB SERPL-MCNC: 0.3 MG/DL (ref 0–1.2)
BUN BLDV-MCNC: 18 MG/DL (ref 6–20)
CHLORIDE BLD-SCNC: 104 MMOL/L (ref 98–107)
CHOLESTEROL, TOTAL: 175 MG/DL
CO2: 26 MMOL/L (ref 22–29)
CREAT SERPL-MCNC: 0.9 MG/DL (ref 0.5–1)
EOSINOPHILS ABSOLUTE: 0.18 K/UL (ref 0.05–0.5)
EOSINOPHILS RELATIVE PERCENT: 2 % (ref 0–6)
FOLLICLE STIMULATING HORMONE: 2 MIU/ML
GFR, ESTIMATED: >90 ML/MIN/1.73M2
GLUCOSE BLD-MCNC: 82 MG/DL (ref 74–99)
HCT VFR BLD CALC: 43.7 % (ref 34–48)
HDLC SERPL-MCNC: 75 MG/DL
HEMOGLOBIN: 13.9 G/DL (ref 11.5–15.5)
IMMATURE GRANULOCYTES %: 0 % (ref 0–5)
IMMATURE GRANULOCYTES ABSOLUTE: 0.03 K/UL (ref 0–0.58)
LDL CHOLESTEROL: 87 MG/DL
LH: 4.4 MIU/ML
LYMPHOCYTES ABSOLUTE: 1.55 K/UL (ref 1.5–4)
LYMPHOCYTES RELATIVE PERCENT: 20 % (ref 20–42)
MCH RBC QN AUTO: 29 PG (ref 26–35)
MCHC RBC AUTO-ENTMCNC: 31.8 G/DL (ref 32–34.5)
MCV RBC AUTO: 91.2 FL (ref 80–99.9)
MONOCYTES ABSOLUTE: 0.64 K/UL (ref 0.1–0.95)
MONOCYTES RELATIVE PERCENT: 8 % (ref 2–12)
NEUTROPHILS ABSOLUTE: 5.41 K/UL (ref 1.8–7.3)
NEUTROPHILS RELATIVE PERCENT: 69 % (ref 43–80)
PDW BLD-RTO: 12.2 % (ref 11.5–15)
PLATELET # BLD: 335 K/UL (ref 130–450)
PMV BLD AUTO: 11 FL (ref 7–12)
POTASSIUM SERPL-SCNC: 4.5 MMOL/L (ref 3.5–5)
RBC # BLD: 4.79 M/UL (ref 3.5–5.5)
SODIUM BLD-SCNC: 143 MMOL/L (ref 132–146)
T4 TOTAL: 8 UG/DL (ref 4.5–11.7)
TOTAL PROTEIN: 7 G/DL (ref 6.4–8.3)
TRIGL SERPL-MCNC: 65 MG/DL
TSH SERPL DL<=0.05 MIU/L-ACNC: 1.17 UIU/ML (ref 0.27–4.2)
VLDLC SERPL CALC-MCNC: 13 MG/DL
WBC # BLD: 7.9 K/UL (ref 4.5–11.5)

## 2024-06-07 ASSESSMENT — PATIENT HEALTH QUESTIONNAIRE - PHQ9
SUM OF ALL RESPONSES TO PHQ9 QUESTIONS 1 & 2: 2
SUM OF ALL RESPONSES TO PHQ QUESTIONS 1-9: 2
SUM OF ALL RESPONSES TO PHQ9 QUESTIONS 1 & 2: 2
1. LITTLE INTEREST OR PLEASURE IN DOING THINGS: SEVERAL DAYS
2. FEELING DOWN, DEPRESSED OR HOPELESS: SEVERAL DAYS
SUM OF ALL RESPONSES TO PHQ QUESTIONS 1-9: 2
SUM OF ALL RESPONSES TO PHQ QUESTIONS 1-9: 2
2. FEELING DOWN, DEPRESSED OR HOPELESS: SEVERAL DAYS
1. LITTLE INTEREST OR PLEASURE IN DOING THINGS: SEVERAL DAYS
SUM OF ALL RESPONSES TO PHQ QUESTIONS 1-9: 2

## 2024-06-10 ENCOUNTER — OFFICE VISIT (OUTPATIENT)
Dept: PRIMARY CARE CLINIC | Age: 24
End: 2024-06-10
Payer: COMMERCIAL

## 2024-06-10 VITALS
TEMPERATURE: 98 F | BODY MASS INDEX: 26.46 KG/M2 | SYSTOLIC BLOOD PRESSURE: 100 MMHG | DIASTOLIC BLOOD PRESSURE: 70 MMHG | HEIGHT: 71 IN | WEIGHT: 189 LBS | OXYGEN SATURATION: 97 % | HEART RATE: 97 BPM

## 2024-06-10 DIAGNOSIS — R09.81 NASAL CONGESTION: ICD-10-CM

## 2024-06-10 DIAGNOSIS — R14.0 ABDOMINAL BLOATING: ICD-10-CM

## 2024-06-10 DIAGNOSIS — R05.1 ACUTE COUGH: ICD-10-CM

## 2024-06-10 DIAGNOSIS — Z00.00 ANNUAL PHYSICAL EXAM: Primary | ICD-10-CM

## 2024-06-10 LAB
BILIRUBIN, POC: NORMAL
BLOOD URINE, POC: NORMAL
CLARITY, POC: CLEAR
COLOR, POC: YELLOW
GLUCOSE URINE, POC: NORMAL
KETONES, POC: NORMAL
LEUKOCYTE EST, POC: NORMAL
NITRITE, POC: NORMAL
PH, POC: 6.5
PROTEIN, POC: NORMAL
SPECIFIC GRAVITY, POC: 1.02
UROBILINOGEN, POC: 0.2

## 2024-06-10 PROCEDURE — 81002 URINALYSIS NONAUTO W/O SCOPE: CPT | Performed by: FAMILY MEDICINE

## 2024-06-10 PROCEDURE — 99395 PREV VISIT EST AGE 18-39: CPT | Performed by: FAMILY MEDICINE

## 2024-06-10 RX ORDER — AZITHROMYCIN 250 MG/1
TABLET, FILM COATED ORAL
Qty: 1 PACKET | Refills: 0 | Status: SHIPPED | OUTPATIENT
Start: 2024-06-10

## 2024-06-10 RX ORDER — PREDNISONE 5 MG/1
TABLET ORAL
Qty: 30 TABLET | Refills: 0 | Status: SHIPPED | OUTPATIENT
Start: 2024-06-10

## 2024-06-10 RX ORDER — BENZONATATE 200 MG/1
200 CAPSULE ORAL 3 TIMES DAILY PRN
Qty: 30 CAPSULE | Refills: 0 | Status: SHIPPED | OUTPATIENT
Start: 2024-06-10 | End: 2024-06-17

## 2024-06-10 RX ORDER — FLUOXETINE HYDROCHLORIDE 40 MG/1
40 CAPSULE ORAL DAILY
Qty: 90 CAPSULE | Refills: 1 | Status: SHIPPED | OUTPATIENT
Start: 2024-06-10

## 2024-06-10 RX ORDER — NORETHINDRONE ACETATE AND ETHINYL ESTRADIOL 1MG-20(24)
KIT ORAL
COMMUNITY
Start: 2024-06-07

## 2024-06-10 RX ORDER — BUPROPION HYDROCHLORIDE 150 MG/1
150 TABLET ORAL EVERY MORNING
Qty: 90 TABLET | Refills: 1 | Status: SHIPPED | OUTPATIENT
Start: 2024-06-10

## 2024-06-10 SDOH — ECONOMIC STABILITY: FOOD INSECURITY: WITHIN THE PAST 12 MONTHS, THE FOOD YOU BOUGHT JUST DIDN'T LAST AND YOU DIDN'T HAVE MONEY TO GET MORE.: NEVER TRUE

## 2024-06-10 SDOH — ECONOMIC STABILITY: INCOME INSECURITY: HOW HARD IS IT FOR YOU TO PAY FOR THE VERY BASICS LIKE FOOD, HOUSING, MEDICAL CARE, AND HEATING?: NOT HARD AT ALL

## 2024-06-10 SDOH — ECONOMIC STABILITY: HOUSING INSECURITY
IN THE LAST 12 MONTHS, WAS THERE A TIME WHEN YOU DID NOT HAVE A STEADY PLACE TO SLEEP OR SLEPT IN A SHELTER (INCLUDING NOW)?: NO

## 2024-06-10 SDOH — ECONOMIC STABILITY: FOOD INSECURITY: WITHIN THE PAST 12 MONTHS, YOU WORRIED THAT YOUR FOOD WOULD RUN OUT BEFORE YOU GOT MONEY TO BUY MORE.: NEVER TRUE

## 2024-06-10 ASSESSMENT — ENCOUNTER SYMPTOMS
EYES NEGATIVE: 1
COUGH: 1
ALLERGIC/IMMUNOLOGIC NEGATIVE: 1
GASTROINTESTINAL NEGATIVE: 1

## 2024-06-10 NOTE — PROGRESS NOTES
6/10/24     Erica Sotomayor    : 2000 Sex: female   Age: 24 y.o.      Chief Complaint   Patient presents with    Annual Exam    Congestion       Prior to Admission medications    Medication Sig Start Date End Date Taking? Authorizing Provider   BLISOVI 24 FE 1-20 MG-MCG(24) TABS  24  Yes Brianna Parnell MD   azithromycin (ZITHROMAX Z-ZAN) 250 MG tablet 2 today  Then 1 qd  4 days 6/10/24  Yes Narciso Caceres DO   predniSONE (DELTASONE) 5 MG tablet 4 tablets daily for 3 days then 3 tablets daily for 3 days then 2 tablets daily for 3 days then 1 tablet daily for 3 days 6/10/24  Yes Narciso Caceres DO   benzonatate (TESSALON) 200 MG capsule Take 1 capsule by mouth 3 times daily as needed for Cough 6/10/24 6/17/24 Yes Narciso Caceres DO   buPROPion (WELLBUTRIN XL) 150 MG extended release tablet Take 1 tablet by mouth every morning 6/10/24  Yes Narciso Caceres DO   FLUoxetine (PROZAC) 40 MG capsule Take 1 capsule by mouth daily 6/10/24  Yes Narciso Caceres DO   fluticasone (FLONASE) 50 MCG/ACT nasal spray 1 spray by Each Nostril route in the morning and 1 spray in the evening. 24   Sarah Singer DO   ibuprofen (ADVIL;MOTRIN) 800 MG tablet Take 1 tablet by mouth every 12 hours as needed (muscle spasms) 22   Narciso Caceres DO   norgestimate-ethinyl estradiol (ORTHO-CYCLEN) 0.25-35 MG-MCG per tablet take 1 tablet by mouth once daily 21   Provider, MD Brianna   EPINEPHrine (EPIPEN 2-ZAN) 0.3 MG/0.3ML SOAJ injection Inject 0.3 mLs into the muscle once for 1 dose Use as directed for allergic reaction  Patient not taking: Reported on 20  Nadiya Isbell DO          HPI: Patient evaluated today health maintenance physical nasal congestion cough abdominal discomfort.  Medically overall doing well with current medications.  Systems review is overall stable.  Mild cough and congestion.  Intermittent problems of abdominal

## 2024-06-18 RX ORDER — PREDNISONE 5 MG/1
TABLET ORAL
Qty: 30 TABLET | Refills: 0 | OUTPATIENT
Start: 2024-06-18

## 2024-07-10 PROBLEM — Z00.00 ANNUAL PHYSICAL EXAM: Status: RESOLVED | Noted: 2020-06-16 | Resolved: 2024-07-10

## 2024-12-02 ENCOUNTER — OFFICE VISIT (OUTPATIENT)
Dept: PRIMARY CARE CLINIC | Age: 24
End: 2024-12-02
Payer: COMMERCIAL

## 2024-12-02 VITALS
SYSTOLIC BLOOD PRESSURE: 110 MMHG | HEART RATE: 91 BPM | DIASTOLIC BLOOD PRESSURE: 70 MMHG | OXYGEN SATURATION: 97 % | TEMPERATURE: 98.5 F | BODY MASS INDEX: 27.3 KG/M2 | HEIGHT: 71 IN | WEIGHT: 195 LBS

## 2024-12-02 DIAGNOSIS — F41.9 ANXIETY: Primary | ICD-10-CM

## 2024-12-02 DIAGNOSIS — R63.5 WEIGHT GAIN: ICD-10-CM

## 2024-12-02 DIAGNOSIS — F90.0 ATTENTION DEFICIT HYPERACTIVITY DISORDER (ADHD), PREDOMINANTLY INATTENTIVE TYPE: ICD-10-CM

## 2024-12-02 PROCEDURE — 99214 OFFICE O/P EST MOD 30 MIN: CPT | Performed by: FAMILY MEDICINE

## 2024-12-02 RX ORDER — FLUOXETINE 40 MG/1
40 CAPSULE ORAL DAILY
Qty: 90 CAPSULE | Refills: 1 | Status: SHIPPED | OUTPATIENT
Start: 2024-12-02

## 2024-12-02 ASSESSMENT — ENCOUNTER SYMPTOMS
EYES NEGATIVE: 1
ALLERGIC/IMMUNOLOGIC NEGATIVE: 1
GASTROINTESTINAL NEGATIVE: 1
RESPIRATORY NEGATIVE: 1

## 2024-12-02 NOTE — PROGRESS NOTES
Cardiovascular:      Rate and Rhythm: Normal rate.   Pulmonary:      Breath sounds: Normal breath sounds.   Abdominal:      General: Bowel sounds are normal.      Palpations: Abdomen is soft.   Musculoskeletal:         General: Normal range of motion.      Cervical back: Normal range of motion and neck supple.   Skin:     General: Skin is warm and dry.   Neurological:      Mental Status: She is alert and oriented to person, place, and time.   Psychiatric:         Behavior: Behavior normal.        Today's vitals physical exam excellent.  Heart was regular lungs are clear.  We will check some baseline labs and then discussed by phone.  Meds to continue as prescribed with discontinuing Wellbutrin.  Reassessment by phone and then most likely will be transferring to [facility] physician.          Plan Per Assessment:  Erica was seen today for anxiety and discuss medications.    Diagnoses and all orders for this visit:    Anxiety  -     CBC with Auto Differential; Future  -     Comprehensive Metabolic Panel; Future  -     T4; Future  -     Lipid Panel; Future  -     TSH; Future    Attention deficit hyperactivity disorder (ADHD), predominantly inattentive type  -     CBC with Auto Differential; Future  -     Comprehensive Metabolic Panel; Future  -     T4; Future  -     Lipid Panel; Future  -     TSH; Future    Weight gain  -     CBC with Auto Differential; Future  -     Comprehensive Metabolic Panel; Future  -     T4; Future  -     Lipid Panel; Future  -     TSH; Future    Other orders  -     FLUoxetine (PROZAC) 40 MG capsule; Take 1 capsule by mouth daily            No follow-ups on file.      Narciso Caceres DO    Note was generated with the assistance of voice recognition software.  Document was reviewed however may contain grammatical errors.

## 2024-12-04 DIAGNOSIS — R63.5 WEIGHT GAIN: ICD-10-CM

## 2024-12-04 DIAGNOSIS — F41.9 ANXIETY: ICD-10-CM

## 2024-12-04 DIAGNOSIS — F90.0 ATTENTION DEFICIT HYPERACTIVITY DISORDER (ADHD), PREDOMINANTLY INATTENTIVE TYPE: ICD-10-CM

## 2024-12-04 LAB
ALBUMIN: 3.8 G/DL (ref 3.5–5.2)
ALP BLD-CCNC: 49 U/L (ref 35–104)
ALT SERPL-CCNC: 14 U/L (ref 0–32)
ANION GAP SERPL CALCULATED.3IONS-SCNC: 12 MMOL/L (ref 7–16)
AST SERPL-CCNC: 19 U/L (ref 0–31)
BASOPHILS ABSOLUTE: 0.03 K/UL (ref 0–0.2)
BASOPHILS RELATIVE PERCENT: 1 % (ref 0–2)
BILIRUB SERPL-MCNC: 0.7 MG/DL (ref 0–1.2)
BUN BLDV-MCNC: 14 MG/DL (ref 6–20)
CALCIUM SERPL-MCNC: 9.6 MG/DL (ref 8.6–10.2)
CHLORIDE BLD-SCNC: 103 MMOL/L (ref 98–107)
CHOLESTEROL, TOTAL: 162 MG/DL
CO2: 23 MMOL/L (ref 22–29)
CREAT SERPL-MCNC: 0.9 MG/DL (ref 0.5–1)
EOSINOPHILS ABSOLUTE: 0.09 K/UL (ref 0.05–0.5)
EOSINOPHILS RELATIVE PERCENT: 2 % (ref 0–6)
GFR, ESTIMATED: >90 ML/MIN/1.73M2
GLUCOSE BLD-MCNC: 80 MG/DL (ref 74–99)
HCT VFR BLD CALC: 43.2 % (ref 34–48)
HDLC SERPL-MCNC: 56 MG/DL
HEMOGLOBIN: 13.9 G/DL (ref 11.5–15.5)
IMMATURE GRANULOCYTES %: 0 % (ref 0–5)
IMMATURE GRANULOCYTES ABSOLUTE: <0.03 K/UL (ref 0–0.58)
LDL CHOLESTEROL: 90 MG/DL
LYMPHOCYTES ABSOLUTE: 1.95 K/UL (ref 1.5–4)
LYMPHOCYTES RELATIVE PERCENT: 40 % (ref 20–42)
MCH RBC QN AUTO: 28.8 PG (ref 26–35)
MCHC RBC AUTO-ENTMCNC: 32.2 G/DL (ref 32–34.5)
MCV RBC AUTO: 89.4 FL (ref 80–99.9)
MONOCYTES ABSOLUTE: 0.4 K/UL (ref 0.1–0.95)
MONOCYTES RELATIVE PERCENT: 8 % (ref 2–12)
NEUTROPHILS ABSOLUTE: 2.4 K/UL (ref 1.8–7.3)
NEUTROPHILS RELATIVE PERCENT: 49 % (ref 43–80)
PDW BLD-RTO: 12.1 % (ref 11.5–15)
PLATELET # BLD: 321 K/UL (ref 130–450)
PMV BLD AUTO: 11.5 FL (ref 7–12)
POTASSIUM SERPL-SCNC: 4.3 MMOL/L (ref 3.5–5)
RBC # BLD: 4.83 M/UL (ref 3.5–5.5)
SODIUM BLD-SCNC: 138 MMOL/L (ref 132–146)
THYROXINE (T4): 7.7 UG/DL (ref 4.5–11.7)
TOTAL PROTEIN: 6.9 G/DL (ref 6.4–8.3)
TRIGL SERPL-MCNC: 79 MG/DL
TSH SERPL DL<=0.05 MIU/L-ACNC: 1.12 UIU/ML (ref 0.27–4.2)
VLDLC SERPL CALC-MCNC: 16 MG/DL
WBC # BLD: 4.9 K/UL (ref 4.5–11.5)

## 2025-01-06 RX ORDER — BUPROPION HYDROCHLORIDE 150 MG/1
150 TABLET ORAL EVERY MORNING
Qty: 90 TABLET | Refills: 1 | OUTPATIENT
Start: 2025-01-06

## 2025-04-26 ENCOUNTER — OFFICE VISIT (OUTPATIENT)
Dept: FAMILY MEDICINE CLINIC | Age: 25
End: 2025-04-26

## 2025-04-26 VITALS
HEART RATE: 93 BPM | OXYGEN SATURATION: 98 % | WEIGHT: 193 LBS | BODY MASS INDEX: 27.02 KG/M2 | DIASTOLIC BLOOD PRESSURE: 80 MMHG | SYSTOLIC BLOOD PRESSURE: 124 MMHG | HEIGHT: 71 IN | TEMPERATURE: 98.4 F

## 2025-04-26 DIAGNOSIS — J30.1 SEASONAL ALLERGIC RHINITIS DUE TO POLLEN: ICD-10-CM

## 2025-04-26 RX ORDER — FLUTICASONE PROPIONATE 50 MCG
1 SPRAY, SUSPENSION (ML) NASAL 2 TIMES DAILY
Qty: 16 G | Refills: 5 | Status: SHIPPED | OUTPATIENT
Start: 2025-04-26

## 2025-04-26 RX ORDER — METHYLPREDNISOLONE ACETATE 80 MG/ML
80 INJECTION, SUSPENSION INTRA-ARTICULAR; INTRALESIONAL; INTRAMUSCULAR; SOFT TISSUE ONCE
Status: COMPLETED | OUTPATIENT
Start: 2025-04-26 | End: 2025-04-26

## 2025-04-26 RX ADMIN — METHYLPREDNISOLONE ACETATE 80 MG: 80 INJECTION, SUSPENSION INTRA-ARTICULAR; INTRALESIONAL; INTRAMUSCULAR; SOFT TISSUE at 11:57

## 2025-06-23 ENCOUNTER — APPOINTMENT (OUTPATIENT)
Dept: DERMATOLOGY | Facility: CLINIC | Age: 25
End: 2025-06-23
Payer: COMMERCIAL

## 2025-06-23 DIAGNOSIS — R68.89 UNWANTED HAIR: ICD-10-CM

## 2025-06-23 DIAGNOSIS — L98.9 DERMATOLOGIC PROBLEM: ICD-10-CM

## 2025-06-23 DIAGNOSIS — L74.519 PRIMARY FOCAL HYPERHIDROSIS: ICD-10-CM

## 2025-06-23 DIAGNOSIS — L70.0 ACNE VULGARIS: Primary | ICD-10-CM

## 2025-06-23 PROCEDURE — 1036F TOBACCO NON-USER: CPT | Performed by: DERMATOLOGY

## 2025-06-23 PROCEDURE — 99204 OFFICE O/P NEW MOD 45 MIN: CPT | Performed by: DERMATOLOGY

## 2025-06-23 RX ORDER — HYDROCORTISONE 25 MG/G
OINTMENT TOPICAL 2 TIMES DAILY
COMMUNITY
Start: 2025-02-14

## 2025-06-23 RX ORDER — GLYCOPYRROLATE 1 MG/1
TABLET ORAL
Qty: 180 TABLET | Refills: 3 | Status: SHIPPED | OUTPATIENT
Start: 2025-06-23

## 2025-06-23 RX ORDER — TRETINOIN 0.25 MG/G
CREAM TOPICAL
Qty: 45 G | Refills: 1 | Status: SHIPPED | OUTPATIENT
Start: 2025-06-23

## 2025-06-23 RX ORDER — AZELAIC ACID 0.15 G/G
GEL TOPICAL
Qty: 50 G | Refills: 1 | Status: CANCELLED | OUTPATIENT
Start: 2025-06-23

## 2025-06-23 RX ORDER — SPIRONOLACTONE 100 MG/1
TABLET, FILM COATED ORAL
Qty: 90 TABLET | Refills: 3 | Status: SHIPPED | OUTPATIENT
Start: 2025-06-23

## 2025-06-23 ASSESSMENT — DERMATOLOGY QUALITY OF LIFE (QOL) ASSESSMENT
RATE HOW BOTHERED YOU ARE BY SYMPTOMS OF YOUR SKIN PROBLEM (EG, ITCHING, STINGING BURNING, HURTING OR SKIN IRRITATION): 0 - NEVER BOTHERED
RATE HOW BOTHERED YOU ARE BY SYMPTOMS OF YOUR SKIN PROBLEM (EG, ITCHING, STINGING BURNING, HURTING OR SKIN IRRITATION): 0 - NEVER BOTHERED
WHAT SINGLE SKIN CONDITION LISTED BELOW IS THE PATIENT ANSWERING THE QUALITY-OF-LIFE ASSESSMENT QUESTIONS ABOUT: ACNE
WHAT SINGLE SKIN CONDITION LISTED BELOW IS THE PATIENT ANSWERING THE QUALITY-OF-LIFE ASSESSMENT QUESTIONS ABOUT: ACNE
RATE HOW EMOTIONALLY BOTHERED YOU ARE BY YOUR SKIN PROBLEM (FOR EXAMPLE, WORRY, EMBARRASSMENT, FRUSTRATION): 3
RATE HOW BOTHERED YOU ARE BY EFFECTS OF YOUR SKIN PROBLEMS ON YOUR ACTIVITIES (EG, GOING OUT, ACCOMPLISHING WHAT YOU WANT, WORK ACTIVITIES OR YOUR RELATIONSHIPS WITH OTHERS): 2
RATE HOW BOTHERED YOU ARE BY EFFECTS OF YOUR SKIN PROBLEMS ON YOUR ACTIVITIES (EG, GOING OUT, ACCOMPLISHING WHAT YOU WANT, WORK ACTIVITIES OR YOUR RELATIONSHIPS WITH OTHERS): 2
RATE HOW EMOTIONALLY BOTHERED YOU ARE BY YOUR SKIN PROBLEM (FOR EXAMPLE, WORRY, EMBARRASSMENT, FRUSTRATION): 3

## 2025-06-23 ASSESSMENT — PATIENT GLOBAL ASSESSMENT (PGA): WHAT IS THE PGA: PATIENT GLOBAL ASSESSMENT:  1 - CLEAR

## 2025-06-23 NOTE — Clinical Note
-Discussed nature of diagnosis  -Discussed treatment options, including laser hair reduction  -Patient was encouraged to see Dr Knapp for [facility] if desires

## 2025-06-23 NOTE — Clinical Note
Deep seated, inflammatory papules and open comedones frontal hairline, lower face, shoulders, chest and back;

## 2025-06-23 NOTE — Clinical Note
Patient with excessive sweating predominantly in the right axillae; also left axillae, chest and face  -Nature of condition discussed  -Treatment options discussed including topicals, orals, injectables and procedures  -Recommend to start Qbrexa daily as tolerated. Discussed to ensure washing hands after application to avoid mydriasis and also to dispose out of access to pets.  -Recommend to start glycopyrrolate, 1mg qAM and 1mg qafternoon with upward dose titration as needed    -Common adverse events of anticholinergics include, but not limited to, dry eyes, dry mouth, urinary retention, blurred vision, headaches, dizziness. Rare more serious adverse events of hyperthermia/heat stroke and seizures may occur. Recommend consultation with opthalmology to check intraocular pressure prior to starting medication and yearly given risk of glaucoma with anticholinergic medications.

## 2025-06-23 NOTE — Clinical Note
-Discussed the nature of the condition  -Patient with long standing hormonal acne; was improved on OCP, but patient discontinued birth control in April 2025 and feels much better off birth control. However, acne is flaring  -Patient does not desire pregnancy at this time  -Discussed treatment options  -Recommend to start spironolactone 50mg at bedtime for 2 weeks, then 100mg daily as tolerated. May need to continue to dose escalate  -Recommend to start tretinoin three times per week topically as tolerated  -Continue LRP cleanser and moisturizer; may discontinue LRP Efaclar cleanser with SA if becoming too dry    -Discussed/information given on the potential adverse effects of spironolactone including but not limited to hypotension, diuresis, muscle cramps, fatigue, breast tenderness, menstrual irregularities, hyperkalemia which may result in muscle dysfunction (although current evidence suggests no need to monitor potassium levels in young healthy adult females).   -Discussed common side effects of lightheadedness or dizziness, which usually resolve within a few days as the body adjusts to the medication if fluid intake is appropriate. Discussed if the patient is feeling unwell or fainting, to discontinue the medication and contact our office.  -Discussed not to take any prescription potassium supplements while taking spironolactone, and to avoid excessive consumption of food/drink containing potassium (such as coconut water).  -There is a black box warning of tumorigenesis in rodents taking very high doses of spironolactone. Epidemiologic studies have shown no increase in malignancy in humans.   -If patient is of child bearing potential, patient counseled that they should NOT get pregnant while on this medication as there is risk of teratogenicity/birth defects and needs to use contraception while taking this medication     Cheek-To-Nose Interpolation Flap Text: A decision was made to reconstruct the defect utilizing an interpolation axial flap and a staged reconstruction.  A telfa template was made of the defect.  This telfa template was then used to outline the Cheek-To-Nose Interpolation flap.  The donor area for the pedicle flap was then injected with anesthesia.  The flap was excised through the skin and subcutaneous tissue down to the layer of the underlying musculature.  The interpolation flap was carefully excised within this deep plane to maintain its blood supply.  The edges of the donor site were undermined.   The donor site was closed in a primary fashion.  The pedicle was then rotated into position and sutured.  Once the tube was sutured into place, adequate blood supply was confirmed with blanching and refill.  The pedicle was then wrapped with xeroform gauze and dressed appropriately with a telfa and gauze bandage to ensure continued blood supply and protect the attached pedicle.

## 2025-06-23 NOTE — PROGRESS NOTES
Subjective     Yahaira Shaikh is a 25 y.o. female who presents for the following: Acne (Face/shoulders/back/chest- recently went off birth control. Using LRP face wash and Neutrogena spot tx. ) and Excessive Sweating (Right axilla- denies history of trying treatment in the past. ).     Review of Systems:  No other skin or systemic complaints other than what is documented elsewhere in the note.    The following portions of the chart were reviewed this encounter and updated as appropriate:   Tobacco  Allergies  Meds  Problems  Med Hx  Surg Hx  Fam Hx              Objective   Well appearing patient in no apparent distress; mood and affect are within normal limits.    A focused skin examination was performed. All findings within normal limits unless otherwise noted below.    Assessment/Plan   Skin Exam  1. ACNE VULGARIS  Head - Anterior (Face), Left Shoulder - Anterior, Right Shoulder - Anterior  Deep seated, inflammatory papules and open comedones frontal hairline, lower face, shoulders, chest and back;   -Discussed the nature of the condition  -Patient with long standing hormonal acne; was improved on OCP, but patient discontinued birth control in April 2025 and feels much better off birth control. However, acne is flaring  -Patient does not desire pregnancy at this time  -Discussed treatment options  -Recommend to start spironolactone 50mg at bedtime for 2 weeks, then 100mg daily as tolerated. May need to continue to dose escalate  -Recommend to start tretinoin three times per week topically as tolerated  -Continue LRP cleanser and moisturizer; may discontinue LRP Efaclar cleanser with SA if becoming too dry    -Discussed/information given on the potential adverse effects of spironolactone including but not limited to hypotension, diuresis, muscle cramps, fatigue, breast tenderness, menstrual irregularities, hyperkalemia which may result in muscle dysfunction (although current evidence suggests no need to  monitor potassium levels in young healthy adult females).   -Discussed common side effects of lightheadedness or dizziness, which usually resolve within a few days as the body adjusts to the medication if fluid intake is appropriate. Discussed if the patient is feeling unwell or fainting, to discontinue the medication and contact our office.  -Discussed not to take any prescription potassium supplements while taking spironolactone, and to avoid excessive consumption of food/drink containing potassium (such as coconut water).  -There is a black box warning of tumorigenesis in rodents taking very high doses of spironolactone. Epidemiologic studies have shown no increase in malignancy in humans.   -If patient is of child bearing potential, patient counseled that they should NOT get pregnant while on this medication as there is risk of teratogenicity/birth defects and needs to use contraception while taking this medication    Related Medications  spironolactone (Aldactone) 100 mg tablet  Take half of one pill (50mg) by mouth at bedtime for 2 weeks. Then take one pill by mouth at bedtime.  tretinoin (Retin-A) 0.025 % cream  Apply a thin layer to affected area at bedtime three nights per week as tolerated.  2. PRIMARY FOCAL HYPERHIDROSIS (2)  Left Axilla, Right Axilla  Normal appearing skin. Excessive sweating noted. Maceration.    Patient with excessive sweating predominantly in the right axillae; also left axillae, chest and face  -Nature of condition discussed  -Treatment options discussed including topicals, orals, injectables and procedures  -Recommend to start Qbrexa daily as tolerated. Discussed to ensure washing hands after application to avoid mydriasis and also to dispose out of access to pets.  -Recommend to start glycopyrrolate, 1mg qAM and 1mg qafternoon with upward dose titration as needed    -Common adverse events of anticholinergics include, but not limited to, dry eyes, dry mouth, urinary retention,  blurred vision, headaches, dizziness. Rare more serious adverse events of hyperthermia/heat stroke and seizures may occur. Recommend consultation with opthalmology to check intraocular pressure prior to starting medication and yearly given risk of glaucoma with anticholinergic medications.   Related Medications  glycopyrrolate (Robinul) 1 mg tablet  Take one pill by mouth each morning and one pill by mouth each afternoon  glycopyrronium tosylate 2.4 % towelette  Apply to underarms once daily. Use same pad across both underarms. Wash hands after use. Dispose of away from pet access.  3. UNWANTED HAIR  Pubic  -Discussed nature of diagnosis  -Discussed treatment options, including laser hair reduction  -Patient was encouraged to see Dr Knapp for [facility] if desires  4. DERMATOLOGIC PROBLEM  Generalized  Related Procedures  Follow Up In Dermatology - Established Patient    Follow up in 3 months for Acne, hyperhidrosis  Discussed if there are any changes or development of concerning symptoms (lesion/skin condition is changing, bleeding, enlarging, or worsening) the patient is to contact my office. The patient verbalizes understanding.    Cony Remy MD  6/23/2025